# Patient Record
Sex: FEMALE | Race: WHITE | Employment: UNEMPLOYED | URBAN - METROPOLITAN AREA
[De-identification: names, ages, dates, MRNs, and addresses within clinical notes are randomized per-mention and may not be internally consistent; named-entity substitution may affect disease eponyms.]

---

## 2017-02-28 ENCOUNTER — OFFICE VISIT (OUTPATIENT)
Dept: ORTHOPEDIC SURGERY | Age: 57
End: 2017-02-28

## 2017-02-28 VITALS
HEART RATE: 62 BPM | BODY MASS INDEX: 25.21 KG/M2 | SYSTOLIC BLOOD PRESSURE: 116 MMHG | DIASTOLIC BLOOD PRESSURE: 74 MMHG | WEIGHT: 137 LBS | HEIGHT: 62 IN

## 2017-02-28 DIAGNOSIS — M17.10 PATELLOFEMORAL ARTHRITIS: ICD-10-CM

## 2017-02-28 DIAGNOSIS — Z98.890 S/P RIGHT KNEE ARTHROSCOPY: Primary | ICD-10-CM

## 2017-02-28 PROCEDURE — 99213 OFFICE O/P EST LOW 20 MIN: CPT | Performed by: ORTHOPAEDIC SURGERY

## 2017-02-28 PROCEDURE — 20610 DRAIN/INJ JOINT/BURSA W/O US: CPT | Performed by: ORTHOPAEDIC SURGERY

## 2017-04-25 ENCOUNTER — OFFICE VISIT (OUTPATIENT)
Dept: ORTHOPEDIC SURGERY | Age: 57
End: 2017-04-25

## 2017-04-25 VITALS
SYSTOLIC BLOOD PRESSURE: 123 MMHG | DIASTOLIC BLOOD PRESSURE: 72 MMHG | WEIGHT: 137 LBS | BODY MASS INDEX: 25.21 KG/M2 | HEIGHT: 62 IN | HEART RATE: 60 BPM

## 2017-04-25 DIAGNOSIS — M19.90 ARTHROSIS: Primary | ICD-10-CM

## 2017-04-25 DIAGNOSIS — M17.10 PATELLOFEMORAL ARTHRITIS: ICD-10-CM

## 2017-04-25 PROCEDURE — 73560 X-RAY EXAM OF KNEE 1 OR 2: CPT | Performed by: ORTHOPAEDIC SURGERY

## 2017-04-25 PROCEDURE — 99213 OFFICE O/P EST LOW 20 MIN: CPT | Performed by: ORTHOPAEDIC SURGERY

## 2017-05-09 ENCOUNTER — TELEPHONE (OUTPATIENT)
Dept: ORTHOPEDIC SURGERY | Age: 57
End: 2017-05-09

## 2017-06-06 DIAGNOSIS — M17.11 PRIMARY OSTEOARTHRITIS OF RIGHT KNEE: Primary | ICD-10-CM

## 2017-06-08 ENCOUNTER — TELEPHONE (OUTPATIENT)
Dept: ORTHOPEDIC SURGERY | Age: 57
End: 2017-06-08

## 2017-06-13 ENCOUNTER — OFFICE VISIT (OUTPATIENT)
Dept: ORTHOPEDIC SURGERY | Age: 57
End: 2017-06-13

## 2017-06-13 VITALS
HEIGHT: 62 IN | BODY MASS INDEX: 24.48 KG/M2 | DIASTOLIC BLOOD PRESSURE: 77 MMHG | SYSTOLIC BLOOD PRESSURE: 121 MMHG | HEART RATE: 55 BPM | WEIGHT: 133 LBS

## 2017-06-13 DIAGNOSIS — M17.11 PRIMARY OSTEOARTHRITIS OF RIGHT KNEE: Primary | ICD-10-CM

## 2017-06-13 PROCEDURE — 20610 DRAIN/INJ JOINT/BURSA W/O US: CPT | Performed by: ORTHOPAEDIC SURGERY

## 2017-06-20 ENCOUNTER — TELEPHONE (OUTPATIENT)
Dept: ORTHOPEDIC SURGERY | Age: 57
End: 2017-06-20

## 2017-11-07 ENCOUNTER — OFFICE VISIT (OUTPATIENT)
Dept: ORTHOPEDIC SURGERY | Age: 57
End: 2017-11-07

## 2017-11-07 VITALS
BODY MASS INDEX: 24.48 KG/M2 | WEIGHT: 133 LBS | HEART RATE: 61 BPM | HEIGHT: 62 IN | SYSTOLIC BLOOD PRESSURE: 141 MMHG | DIASTOLIC BLOOD PRESSURE: 77 MMHG

## 2017-11-07 DIAGNOSIS — M17.11 PRIMARY OSTEOARTHRITIS OF RIGHT KNEE: Primary | ICD-10-CM

## 2017-11-07 PROCEDURE — 99213 OFFICE O/P EST LOW 20 MIN: CPT | Performed by: ORTHOPAEDIC SURGERY

## 2017-11-07 RX ORDER — ROSUVASTATIN CALCIUM 20 MG/1
TABLET, COATED ORAL
Refills: 3 | COMMUNITY
Start: 2017-10-16 | End: 2018-08-22 | Stop reason: SDUPTHER

## 2017-11-07 NOTE — PROGRESS NOTES
Chief Complaint    Follow-up (right knee; auth for Synvisc injection)      History of Present Illness:  Vaishnavi Rendon is a 55 y.o. female  Pain Assessment  Location of Pain: Knee  Location Modifiers: Right  Severity of Pain: 4  Quality of Pain: Sharp  Duration of Pain:  (weeks)  Aggravating Factors: Standing, Walking  Relieving Factors: Rest, Nsaids  Result of Injury: No  Work-Related Injury: No  Are there other pain locations you wish to document?: No     Patient is being seen for a follow up visit for persistent right knee pain. Patient has been diagnosed with right knee OA and prescribed conservative treatment measures. This has included activity modifications, following HEP developed by physical therapy and injections. Patient was given a Synvisc injection on 6/13/17 and received tremendous relief of symptoms, but has noticed these progressing again. Pain continues to be predominately medial sided, typically achy in nature but can become sharp. Patient has difficulty with prolonged walking and standing. Patient presents for further evaluation and treatment of right knee OA. Medical History:  Patient's medications, allergies, past medical, surgical, social and family histories were reviewed and updated as appropriate.     Allergies   Allergen Reactions    Hydrocodone-Acetaminophen Nausea And Vomiting     Current Outpatient Prescriptions on File Prior to Visit   Medication Sig Dispense Refill    ibuprofen (ADVIL;MOTRIN) 200 MG tablet Take 200 mg by mouth every 6 hours as needed for Pain      carvedilol (COREG) 6.25 MG tablet Take 6.25 mg by mouth 2 times daily (with meals)      escitalopram (LEXAPRO) 10 MG tablet Take 10 mg by mouth daily      aspirin 81 MG tablet Take 81 mg by mouth daily      Multiple Vitamins-Minerals (CENTRUM ADULTS PO) Take by mouth      ezetimibe (ZETIA) 10 MG tablet Take 10 mg by mouth daily      atorvastatin (LIPITOR) 20 MG tablet Take 20 mg by mouth daily       No current in her symptom relief to date. Patient was in agreement with this. All questions answered. Follow up: Upon Synvisc approval      I Janett Colorado MS, ATC, am scribing for and in the presence of Dr. Catalino Leo     11:51 AM    Janett Colorado MS, ATC  This dictation was performed with a verbal recognition program [facility]) and it was checked for errors. It is possible that there are still dictated errors within this office note. If so, please bring any errors to my attention for an addendum. All efforts were made to ensure that this office note is accurate. Supervising Physician Attestation:  I, Dr. Deanna Lu, personally performed the services described in this documentation as scribed above, and it is both accurate and complete and I agree with all pertinent clinical information. I personally interviewed the patient and performed a physical examination and medical decision making. I discussed the patient's condition and treatment options and have  reviewed and agree with the past medical, family and social history unless otherwise noted. All of the patient's questions were answered.       Board Certified Orthopaedic Surgeon  [facility] and [address] and [address] [employer]  Professor of [employer]

## 2017-11-27 ENCOUNTER — TELEPHONE (OUTPATIENT)
Dept: ORTHOPEDIC SURGERY | Age: 57
End: 2017-11-27

## 2017-11-27 NOTE — TELEPHONE ENCOUNTER
11/27/2017  SYNVISC-ONE     RIGHT  KNEE. APPROVED # [id number]. DATES:  12/13/2017 - 06/13/2018. PER FAX FROM [facility].   AP

## 2017-12-10 ENCOUNTER — HOSPITAL ENCOUNTER (EMERGENCY)
Age: 57
Discharge: HOME | End: 2017-12-10
Attending: EMERGENCY MEDICINE
Payer: COMMERCIAL

## 2017-12-10 VITALS
DIASTOLIC BLOOD PRESSURE: 64 MMHG | WEIGHT: 135 LBS | BODY MASS INDEX: 24.84 KG/M2 | OXYGEN SATURATION: 98 % | HEART RATE: 63 BPM | TEMPERATURE: 98.3 F | HEIGHT: 62 IN | SYSTOLIC BLOOD PRESSURE: 116 MMHG | RESPIRATION RATE: 16 BRPM

## 2017-12-10 DIAGNOSIS — J02.9 ACUTE PHARYNGITIS, UNSPECIFIED ETIOLOGY: ICD-10-CM

## 2017-12-10 DIAGNOSIS — J01.00 ACUTE MAXILLARY SINUSITIS, RECURRENCE NOT SPECIFIED: Primary | ICD-10-CM

## 2017-12-10 PROCEDURE — 99213 OFFICE O/P EST LOW 20 MIN: CPT | Performed by: EMERGENCY MEDICINE

## 2017-12-10 PROCEDURE — 99213 OFFICE O/P EST LOW 20 MIN: CPT

## 2017-12-10 RX ORDER — AMOXICILLIN AND CLAVULANATE POTASSIUM 875; 125 MG/1; MG/1
1 TABLET, FILM COATED ORAL 2 TIMES DAILY
Qty: 14 TABLET | Refills: 0 | Status: SHIPPED | OUTPATIENT
Start: 2017-12-10 | End: 2017-12-17

## 2017-12-10 RX ORDER — FLUTICASONE PROPIONATE 50 MCG
1 SPRAY, SUSPENSION (ML) NASAL DAILY
Qty: 1 BOTTLE | Refills: 0 | Status: SHIPPED | OUTPATIENT
Start: 2017-12-10 | End: 2018-08-22 | Stop reason: ALTCHOICE

## 2017-12-10 ASSESSMENT — PAIN DESCRIPTION - LOCATION: LOCATION: FACE

## 2017-12-10 ASSESSMENT — ENCOUNTER SYMPTOMS
TROUBLE SWALLOWING: 0
FACIAL SWELLING: 0
NAUSEA: 0
SORE THROAT: 1
SHORTNESS OF BREATH: 0
ABDOMINAL PAIN: 0
CHOKING: 0
EYE REDNESS: 0
RHINORRHEA: 1
SINUS PRESSURE: 0
EYE DISCHARGE: 0
VOICE CHANGE: 0
CONSTIPATION: 0
BLOOD IN STOOL: 0
WHEEZING: 0
STRIDOR: 0
EYE PAIN: 0
DIARRHEA: 0
VOMITING: 0
COUGH: 1
BACK PAIN: 0

## 2017-12-10 ASSESSMENT — PAIN DESCRIPTION - PAIN TYPE: TYPE: ACUTE PAIN

## 2017-12-10 ASSESSMENT — PAIN SCALES - GENERAL: PAINLEVEL_OUTOF10: 7

## 2017-12-10 ASSESSMENT — PAIN DESCRIPTION - DESCRIPTORS: DESCRIPTORS: PRESSURE

## 2017-12-10 NOTE — LETTER
[address] [facility]  [address] [place], [place] [address]KT MARISOL MONTELONGO II.[facility] [address]  Phone: [phone]               December 10, 2017    Patient: Jaren Medina   YOB: 1960   Date of Visit: 12/10/2017       To Whom It May Concern:    Florentino Parker was seen and treated in our emergency department on 12/10/2017. She may return to work on 12/13/17.   No work December 11December 12, 2017      Sincerely,       Carmina Dietz MD         Signature:__________________________________

## 2017-12-10 NOTE — ED PROVIDER NOTES
Pawel Vu [facility]  Urgent Care Encounter      CHIEF COMPLAINT       Chief Complaint   Patient presents with    Sinusitis    Generalized Body Aches       Nurses Notes reviewed and I agree except as noted in the HPI. HISTORY OF PRESENT ILLNESS   Moni Singh is a 65 y.o. female who presents With 2 day history of maxillary sinus pain, which she rates at 7 out of 10 severity, postnasal drainage, sore throat, fatigue, malaise, painful glands in the neck, dry cough. No chest pain, shortness of breath, abdominal pain, vomiting, dizziness, syncope, altered mental status, lethargy, rash,  symptoms. Nonsmoker. History of hypertension. S/P T&A    REVIEW OF SYSTEMS     Review of Systems   Constitutional: Positive for appetite change and chills. Negative for fatigue, fever and unexpected weight change. HENT: Positive for congestion, postnasal drip, rhinorrhea and sore throat. Negative for ear discharge, ear pain, facial swelling, hearing loss, nosebleeds, sinus pressure, trouble swallowing and voice change. Eyes: Negative for pain, discharge, redness and visual disturbance. Respiratory: Positive for cough. Negative for choking, shortness of breath, wheezing and stridor. Cardiovascular: Negative for chest pain and leg swelling. Gastrointestinal: Negative for abdominal pain, blood in stool, constipation, diarrhea, nausea and vomiting. Genitourinary: Negative for dysuria, flank pain, frequency, hematuria, urgency, vaginal bleeding and vaginal discharge. Musculoskeletal: Negative for arthralgias, back pain, neck pain and neck stiffness. Skin: Negative for rash. Neurological: Positive for headaches. Negative for dizziness, seizures, syncope, weakness and light-headedness. Hematological: Negative for adenopathy. Does not bruise/bleed easily. Psychiatric/Behavioral: Negative for confusion, sleep disturbance and suicidal ideas. The patient is not nervous/anxious.     All other systems Posterior oropharyngeal erythema present. No oropharyngeal exudate, posterior oropharyngeal edema or tonsillar abscesses. Eyes: Conjunctivae and EOM are normal. Pupils are equal, round, and reactive to light. Right eye exhibits no discharge. Left eye exhibits no discharge. No scleral icterus. Neck: Normal range of motion. No JVD present. No thyromegaly present. No meningismus   Cardiovascular: Normal rate, regular rhythm, S1 normal, S2 normal, normal heart sounds, intact distal pulses and normal pulses. Exam reveals no gallop and no friction rub. No murmur heard. Pulmonary/Chest: Effort normal and breath sounds normal. No stridor. No tachypnea. No respiratory distress. She has no decreased breath sounds. She has no wheezes. She has no rhonchi. She has no rales. She exhibits no tenderness. Dry cough, lungs clear   Abdominal: Soft. Bowel sounds are normal. She exhibits no distension and no mass. There is no tenderness. There is no rebound and no guarding. Musculoskeletal: Normal range of motion. She exhibits no edema or tenderness. Right lower leg: Normal.        Left lower leg: Normal.   Lymphadenopathy:     She has cervical adenopathy. Right cervical: Superficial cervical and deep cervical adenopathy present. Left cervical: Superficial cervical and deep cervical adenopathy present. Neurological: She is alert and oriented to person, place, and time. She has normal reflexes. No cranial nerve deficit. She exhibits normal muscle tone. Coordination normal.   Appropriate, no focal findings   Skin: Skin is warm and dry. No rash noted. She is not diaphoretic. No erythema. No rash   Psychiatric: She has a normal mood and affect. Her behavior is normal. Judgment and thought content normal.   Nursing note and vitals reviewed. DIAGNOSTIC RESULTS   Labs:No results found for this visit on 12/10/17.     IMAGING:  No orders to display     URGENT CARE COURSE:     Vitals:    12/10/17 1208 BP: 116/64   Pulse: 63   Resp: 16   Temp: 98.3 °F (36.8 °C)   TempSrc: Oral   SpO2: 98%   Weight: 135 lb (61.2 kg)   Height: 5' 2\" (1.575 m)       Medications - No data to display  PROCEDURES:  None  FINAL IMPRESSION      1. Acute maxillary sinusitis, recurrence not specified    2. Acute pharyngitis, unspecified etiology        DISPOSITION/PLAN   DISPOSITION Decision to Discharge  nontoxic, well-hydrated, normal airway. No airway abscess or epiglottitis, sepsis, CNS infection, pneumonia, hypoxia, bronchospasm. Patient has acute sinusitis and acute pharyngitis.   Will treat with Augmentin, Flonase, Coricidin HBP, Tylenol, Motrin, increased oral clear liquids, vaporizer, rest.  Patient to recheck with PCP in 5 days if problems persist, and understands to go to ED if worse  PATIENT REFERRED TO:  Alyce Downing, [address] [facility] [address]  [phone]    Schedule an appointment as soon as possible for a visit in 5 days  recheck if problems persist, go to emergency if worse    DISCHARGE MEDICATIONS:  New Prescriptions    AMOXICILLIN-CLAVULANATE (AUGMENTIN) 875-125 MG PER TABLET    Take 1 tablet by mouth 2 times daily for 7 days    DEXTROMETHORPHAN-GUAIFENESIN (CORICIDIN HBP CONGESTION/COUGH)  MG CAPS    Take 1 tablet by mouth 4 times daily as needed (1 by mouth 4 times daily for sinus pain and pressure, congestion, postnasal drainage, ear pain and pressure, cough)    FLUTICASONE (FLONASE) 50 MCG/ACT NASAL SPRAY    1 spray by Nasal route daily     Current Discharge Medication List          MD Malini Osman MD  12/10/17 0956

## 2017-12-14 ENCOUNTER — OFFICE VISIT (OUTPATIENT)
Dept: ORTHOPEDIC SURGERY | Age: 57
End: 2017-12-14

## 2017-12-14 VITALS
WEIGHT: 134.92 LBS | HEART RATE: 64 BPM | BODY MASS INDEX: 24.83 KG/M2 | DIASTOLIC BLOOD PRESSURE: 91 MMHG | SYSTOLIC BLOOD PRESSURE: 148 MMHG | HEIGHT: 62 IN

## 2017-12-14 DIAGNOSIS — M17.11 PRIMARY OSTEOARTHRITIS OF RIGHT KNEE: Primary | ICD-10-CM

## 2017-12-14 PROCEDURE — 99213 OFFICE O/P EST LOW 20 MIN: CPT | Performed by: ORTHOPAEDIC SURGERY

## 2017-12-14 PROCEDURE — 20610 DRAIN/INJ JOINT/BURSA W/O US: CPT | Performed by: ORTHOPAEDIC SURGERY

## 2017-12-14 NOTE — PROGRESS NOTES
[address] [facility]  Follow Up Knee Pain      Chief Complaint    Follow-up (right knee)      History of Present Illness:  Clay Curtis is a 65y.o. year old female who presents for synviscOne injection into her right knee. Her last injection provided relief until about 6 weeks ago. Pain Assessment  Location of Pain: Knee  Location Modifiers: Right  Severity of Pain: 4  Quality of Pain: Sharp  Frequency of Pain: Constant (just when standing and moving of the knee )  Aggravating Factors: Standing (twisting and moving of the knee)  Limiting Behavior: Yes  Relieving Factors: Rest  Result of Injury: No  Work-Related Injury: No  Are there other pain locations you wish to document?: No    Past Medical History:   Diagnosis Date    Hyperlipidemia     Hypertension     Meniscus tear       Social History     Social History    Marital status:      Spouse name: N/A    Number of children: N/A    Years of education: N/A     Occupational History    Not on file.      Social History Main Topics    Smoking status: Never Smoker    Smokeless tobacco: Never Used    Alcohol use 0.0 oz/week      Comment: occ    Drug use: No    Sexual activity: Not on file     Other Topics Concern    Not on file     Social History Narrative    ** Merged History Encounter **          Current Outpatient Prescriptions on File Prior to Visit   Medication Sig Dispense Refill    amoxicillin-clavulanate (AUGMENTIN) 875-125 MG per tablet Take 1 tablet by mouth 2 times daily for 7 days 14 tablet 0    rosuvastatin (CRESTOR) 20 MG tablet TK 1 T PO QD  3    ibuprofen (ADVIL;MOTRIN) 200 MG tablet Take 200 mg by mouth every 6 hours as needed for Pain      carvedilol (COREG) 6.25 MG tablet Take 6.25 mg by mouth 2 times daily (with meals)      escitalopram (LEXAPRO) 10 MG tablet Take 10 mg by mouth daily      aspirin 81 MG tablet Take 81 mg by mouth daily      Multiple Vitamins-Minerals (CENTRUM ADULTS

## 2018-02-16 ENCOUNTER — TELEPHONE (OUTPATIENT)
Dept: ORTHOPEDIC SURGERY | Age: 58
End: 2018-02-16

## 2018-03-01 ENCOUNTER — OFFICE VISIT (OUTPATIENT)
Dept: ORTHOPEDIC SURGERY | Age: 58
End: 2018-03-01

## 2018-03-01 VITALS
SYSTOLIC BLOOD PRESSURE: 126 MMHG | WEIGHT: 134 LBS | BODY MASS INDEX: 24.66 KG/M2 | HEART RATE: 62 BPM | HEIGHT: 62 IN | DIASTOLIC BLOOD PRESSURE: 84 MMHG

## 2018-03-01 DIAGNOSIS — M25.561 RIGHT KNEE PAIN, UNSPECIFIED CHRONICITY: ICD-10-CM

## 2018-03-01 DIAGNOSIS — M17.10 PATELLOFEMORAL ARTHRITIS: Primary | ICD-10-CM

## 2018-03-01 DIAGNOSIS — M23.203 DEGENERATIVE TEAR OF MEDIAL MENISCUS, RIGHT: ICD-10-CM

## 2018-03-01 PROCEDURE — 99213 OFFICE O/P EST LOW 20 MIN: CPT | Performed by: ORTHOPAEDIC SURGERY

## 2018-03-08 DIAGNOSIS — S83.241A TEAR OF MEDIAL MENISCUS OF RIGHT KNEE, UNSPECIFIED TEAR TYPE, UNSPECIFIED WHETHER OLD OR CURRENT TEAR, INITIAL ENCOUNTER: Primary | ICD-10-CM

## 2018-03-22 ENCOUNTER — OFFICE VISIT (OUTPATIENT)
Dept: ORTHOPEDIC SURGERY | Age: 58
End: 2018-03-22

## 2018-03-22 VITALS
HEART RATE: 65 BPM | HEIGHT: 62 IN | WEIGHT: 134 LBS | DIASTOLIC BLOOD PRESSURE: 90 MMHG | BODY MASS INDEX: 24.66 KG/M2 | SYSTOLIC BLOOD PRESSURE: 140 MMHG

## 2018-03-22 DIAGNOSIS — M25.561 RIGHT KNEE PAIN, UNSPECIFIED CHRONICITY: Primary | ICD-10-CM

## 2018-03-22 DIAGNOSIS — M17.11 ARTHRITIS OF RIGHT KNEE: ICD-10-CM

## 2018-03-22 PROCEDURE — 99213 OFFICE O/P EST LOW 20 MIN: CPT | Performed by: ORTHOPAEDIC SURGERY

## 2018-03-22 NOTE — PROGRESS NOTES
ROS  No change in medical status
MRI that was performed. She does have medial compartment arthrosis that is quite advanced. Today she reports that her symptoms have subsided significantly. We are recommending that she stay conservative and her management which includes weight loss reduction, physical therapy, use of oral anti-inflammatories and intermittent use of injections as needed. She currently is not responding well to the injections including cortisone and Visco supplementation. Should she continue to have persistent symptoms we can consider doing a partial medial unicompartmental replacement. Follow up in: oeb    1:36 PM      Kay Pereira, [address] [facility] Physician Assistant    During this examination, Caren Mcmillan PA-C, functioned as a scribe for Dr. Rufino Garcia. This dictation was performed with a verbal recognition program ([employer]) and it was checked for errors. It is possible that there are still dictated errors within this office note. If so, please bring any errors to my attention for an addendum. All efforts were made to ensure that this office note is accurate. This dictation was performed with a verbal recognition program (DRAGON) and it was checked for errors. It is possible that there are still dictated errors within this office note. If so, please bring any errors to my attention for an addendum. All efforts were made to ensure that this office note is accurate. Supervising Physician Attestation:  I, Dr. Rufino Garcia, personally performed the services described in this documentation as scribed above, and it is both accurate and complete and I agree with all pertinent clinical information. I personally interviewed the patient and performed a physical examination and medical decision making. I discussed the patient's condition and treatment options and have  reviewed and agree with the past medical, family and social history unless otherwise noted.  All of the patient's questions were

## 2018-04-12 ENCOUNTER — OFFICE VISIT (OUTPATIENT)
Dept: ORTHOPEDIC SURGERY | Age: 58
End: 2018-04-12

## 2018-04-12 VITALS
WEIGHT: 134 LBS | SYSTOLIC BLOOD PRESSURE: 150 MMHG | DIASTOLIC BLOOD PRESSURE: 91 MMHG | BODY MASS INDEX: 24.66 KG/M2 | HEART RATE: 61 BPM | HEIGHT: 62 IN

## 2018-04-12 DIAGNOSIS — E65 FAT PAD SYNDROME: ICD-10-CM

## 2018-04-12 DIAGNOSIS — M17.11 PRIMARY OSTEOARTHRITIS OF RIGHT KNEE: ICD-10-CM

## 2018-04-12 DIAGNOSIS — M25.561 RIGHT KNEE PAIN, UNSPECIFIED CHRONICITY: Primary | ICD-10-CM

## 2018-04-12 DIAGNOSIS — M17.10 PATELLOFEMORAL ARTHRITIS: ICD-10-CM

## 2018-04-12 PROCEDURE — 20610 DRAIN/INJ JOINT/BURSA W/O US: CPT | Performed by: ORTHOPAEDIC SURGERY

## 2018-04-12 PROCEDURE — 99213 OFFICE O/P EST LOW 20 MIN: CPT | Performed by: ORTHOPAEDIC SURGERY

## 2018-05-21 LAB
CHOLESTEROL, TOTAL: 205 MG/DL
CHOLESTEROL/HDL RATIO: ABNORMAL
HDLC SERPL-MCNC: 77 MG/DL (ref 35–70)
LDL CHOLESTEROL CALCULATED: 118 MG/DL (ref 0–160)
TRIGL SERPL-MCNC: 50 MG/DL
VLDLC SERPL CALC-MCNC: ABNORMAL MG/DL

## 2018-06-06 ENCOUNTER — TELEPHONE (OUTPATIENT)
Dept: ORTHOPEDIC SURGERY | Age: 58
End: 2018-06-06

## 2018-06-06 DIAGNOSIS — M17.11 PRIMARY LOCALIZED OSTEOARTHROSIS OF THE KNEE, RIGHT: Primary | ICD-10-CM

## 2018-06-25 ENCOUNTER — TELEPHONE (OUTPATIENT)
Dept: ORTHOPEDIC SURGERY | Age: 58
End: 2018-06-25

## 2018-06-26 ENCOUNTER — OFFICE VISIT (OUTPATIENT)
Dept: ORTHOPEDIC SURGERY | Age: 58
End: 2018-06-26

## 2018-06-26 VITALS
WEIGHT: 134 LBS | HEART RATE: 65 BPM | BODY MASS INDEX: 24.66 KG/M2 | HEIGHT: 62 IN | SYSTOLIC BLOOD PRESSURE: 146 MMHG | DIASTOLIC BLOOD PRESSURE: 84 MMHG

## 2018-06-26 DIAGNOSIS — M17.11 PRIMARY OSTEOARTHRITIS OF RIGHT KNEE: ICD-10-CM

## 2018-06-26 DIAGNOSIS — M17.10 PATELLOFEMORAL ARTHRITIS: ICD-10-CM

## 2018-06-26 DIAGNOSIS — M25.561 CHRONIC PAIN OF RIGHT KNEE: Primary | ICD-10-CM

## 2018-06-26 DIAGNOSIS — G89.29 CHRONIC PAIN OF RIGHT KNEE: Primary | ICD-10-CM

## 2018-06-26 PROCEDURE — 20610 DRAIN/INJ JOINT/BURSA W/O US: CPT | Performed by: ORTHOPAEDIC SURGERY

## 2018-07-24 ENCOUNTER — OFFICE VISIT (OUTPATIENT)
Dept: ORTHOPEDIC SURGERY | Age: 58
End: 2018-07-24

## 2018-07-24 VITALS
SYSTOLIC BLOOD PRESSURE: 138 MMHG | WEIGHT: 134 LBS | HEART RATE: 78 BPM | BODY MASS INDEX: 24.66 KG/M2 | HEIGHT: 62 IN | DIASTOLIC BLOOD PRESSURE: 87 MMHG

## 2018-07-24 DIAGNOSIS — M17.11 PRIMARY OSTEOARTHRITIS OF RIGHT KNEE: Primary | ICD-10-CM

## 2018-07-24 DIAGNOSIS — M25.561 CHRONIC PAIN OF RIGHT KNEE: ICD-10-CM

## 2018-07-24 DIAGNOSIS — G89.29 CHRONIC PAIN OF RIGHT KNEE: ICD-10-CM

## 2018-07-24 PROCEDURE — 99214 OFFICE O/P EST MOD 30 MIN: CPT | Performed by: ORTHOPAEDIC SURGERY

## 2018-07-24 NOTE — PROGRESS NOTES
oz/week      Comment: occ    Drug use: No    Sexual activity: Not Asked     Other Topics Concern    None     Social History Narrative    ** Merged History Encounter **            Current Outpatient Prescriptions   Medication Sig Dispense Refill    fluticasone (FLONASE) 50 MCG/ACT nasal spray 1 spray by Nasal route daily 1 Bottle 0    rosuvastatin (CRESTOR) 20 MG tablet TK 1 T PO QD  3    ibuprofen (ADVIL;MOTRIN) 200 MG tablet Take 200 mg by mouth every 6 hours as needed for Pain      aspirin 81 MG tablet Take 81 mg by mouth daily      Multiple Vitamins-Minerals (CENTRUM ADULTS PO) Take by mouth      Dextromethorphan-guaiFENesin (CORICIDIN HBP CONGESTION/COUGH)  MG CAPS Take 1 tablet by mouth 4 times daily as needed (1 by mouth 4 times daily for sinus pain and pressure, congestion, postnasal drainage, ear pain and pressure, cough) 30 capsule 0    carvedilol (COREG) 6.25 MG tablet Take 6.25 mg by mouth 2 times daily (with meals)      escitalopram (LEXAPRO) 10 MG tablet Take 10 mg by mouth daily       No current facility-administered medications for this visit. Allergies   Allergen Reactions    Hydrocodone-Acetaminophen Nausea And Vomiting       Review of Systems:  A 14 point review of systems was completed by the patient and is available in the media section of the scanned medical record and was reviewed on 7/24/2018. The review is negative with the exception of those things mentioned in the HPI and Past Medical History     Vital Signs:   /87   Pulse 78   Ht 5' 2\" (1.575 m)   Wt 134 lb (60.8 kg)   LMP 11/19/2013   BMI 24.51 kg/m²     General Exam:   Mental Status: The patient is oriented to time, place and person. The patient's mood and affect are appropriate. Neurological: The patient has good coordination. There is no weakness or sensory deficit. Knee Examination: RIGHT    Skin:  There are no skin lesions, cellulitis, or extreme edema in the lower extremities.  Sensation is

## 2018-07-30 ENCOUNTER — HOSPITAL ENCOUNTER (OUTPATIENT)
Dept: WOMENS IMAGING | Age: 58
Discharge: HOME | End: 2018-07-30
Payer: COMMERCIAL

## 2018-07-30 ENCOUNTER — HOSPITAL ENCOUNTER (EMERGENCY)
Age: 58
Discharge: HOME | End: 2018-07-30
Payer: COMMERCIAL

## 2018-07-30 VITALS
BODY MASS INDEX: 25.42 KG/M2 | DIASTOLIC BLOOD PRESSURE: 84 MMHG | HEART RATE: 78 BPM | SYSTOLIC BLOOD PRESSURE: 138 MMHG | OXYGEN SATURATION: 99 % | RESPIRATION RATE: 16 BRPM | WEIGHT: 139 LBS | TEMPERATURE: 98 F

## 2018-07-30 DIAGNOSIS — Z12.31 VISIT FOR SCREENING MAMMOGRAM: ICD-10-CM

## 2018-07-30 DIAGNOSIS — J06.9 UPPER RESPIRATORY TRACT INFECTION, UNSPECIFIED TYPE: Primary | ICD-10-CM

## 2018-07-30 LAB
GROUP A STREP CULTURE, REFLEX: NEGATIVE
REFLEX THROAT C + S: NORMAL

## 2018-07-30 PROCEDURE — 77063 BREAST TOMOSYNTHESIS BI: CPT

## 2018-07-30 PROCEDURE — 99213 OFFICE O/P EST LOW 20 MIN: CPT

## 2018-07-30 PROCEDURE — 99213 OFFICE O/P EST LOW 20 MIN: CPT | Performed by: NURSE PRACTITIONER

## 2018-07-30 PROCEDURE — 87070 CULTURE OTHR SPECIMN AEROBIC: CPT

## 2018-07-30 RX ORDER — AMOXICILLIN AND CLAVULANATE POTASSIUM 875; 125 MG/1; MG/1
1 TABLET, FILM COATED ORAL 2 TIMES DAILY
Qty: 20 TABLET | Refills: 0 | Status: SHIPPED | OUTPATIENT
Start: 2018-07-30 | End: 2018-08-09

## 2018-07-30 ASSESSMENT — ENCOUNTER SYMPTOMS
SINUS PRESSURE: 0
VOICE CHANGE: 1
RHINORRHEA: 0
DIARRHEA: 0
VOMITING: 0
SHORTNESS OF BREATH: 0
SORE THROAT: 0
CHEST TIGHTNESS: 0
ABDOMINAL PAIN: 0
NAUSEA: 0

## 2018-07-30 ASSESSMENT — PAIN SCALES - GENERAL: PAINLEVEL_OUTOF10: 4

## 2018-07-30 ASSESSMENT — PAIN DESCRIPTION - PAIN TYPE: TYPE: ACUTE PAIN

## 2018-07-30 ASSESSMENT — PAIN DESCRIPTION - DESCRIPTORS: DESCRIPTORS: ACHING

## 2018-07-30 ASSESSMENT — PAIN DESCRIPTION - FREQUENCY: FREQUENCY: CONTINUOUS

## 2018-07-30 ASSESSMENT — PAIN DESCRIPTION - LOCATION: LOCATION: NECK

## 2018-07-30 NOTE — ED PROVIDER NOTES
Neurological: She is alert and oriented to person, place, and time. Skin: Skin is warm and dry. No rash (on exposed surfaces) noted. She is not diaphoretic. Psychiatric: She has a normal mood and affect. Her speech is normal.   Nursing note and vitals reviewed. DIAGNOSTIC RESULTS   Labs:  Results for orders placed or performed during the hospital encounter of 07/30/18   STREP A ANTIGEN   Result Value Ref Range    GROUP A STREP CULTURE, REFLEX NEGATIVE        IMAGING:  No orders to display     URGENT CARE COURSE:     Vitals:    07/30/18 1150 07/30/18 1154   BP:  138/84   Pulse:  78   Resp:  16   Temp:  98 °F (36.7 °C)   TempSrc:  Oral   SpO2:  99%   Weight: 139 lb (63 kg)        Medications - No data to display  PROCEDURES:  None  FINAL IMPRESSION      1. Upper respiratory tract infection, unspecified type        DISPOSITION/PLAN   DISPOSITION    Discharge   Physical assessment findings, diagnostic testing(s) if applicable, and vital signs reviewed with patient/patient representative. Questions answered. Medications as directed, including OTC medications for supportive care. Education provided on medications. Differential diagnosis(s) discussed with patient/patient representative. Home care/self care instructions reviewed with patient/patient representative. Patient is to follow-up with family care provider in 2-3 days if no improvement. Patient is to go to the emergency department if symptoms worsen. Patient/patient representative is aware of care plan, questions answered, verbalizes understanding and is in agreement. Teach back method used for patient/patient representative teaching(s) and printed instructions attached to after visit summary.     PATIENT REFERRED TO:  Brayan Addison MD  [facility] 23  [address] [facility]  [facility]  [phone]    Schedule an appointment as soon as possible for a visit in 1 week  for further evalation, If symptoms worsen, GO DIRECTLY TO THE EMERGENCY

## 2018-07-31 ENCOUNTER — HOSPITAL ENCOUNTER (OUTPATIENT)
Dept: WOMENS IMAGING | Age: 58
Discharge: HOME | End: 2018-07-31
Payer: COMMERCIAL

## 2018-07-31 DIAGNOSIS — Z12.31 VISIT FOR SCREENING MAMMOGRAM: ICD-10-CM

## 2018-07-31 PROCEDURE — 3209999900 MAM COMPARISON OF OUTSIDE IMAGES

## 2018-08-01 LAB — THROAT/NOSE CULTURE: NORMAL

## 2018-08-10 ENCOUNTER — TELEPHONE (OUTPATIENT)
Dept: ORTHOPEDIC SURGERY | Age: 58
End: 2018-08-10

## 2018-08-10 DIAGNOSIS — M17.11 PRIMARY OSTEOARTHRITIS OF RIGHT KNEE: Primary | ICD-10-CM

## 2018-08-13 ENCOUNTER — HOSPITAL ENCOUNTER (OUTPATIENT)
Dept: CT IMAGING | Age: 58
Discharge: HOME | End: 2018-08-13
Payer: COMMERCIAL

## 2018-08-13 DIAGNOSIS — M17.11 PRIMARY OSTEOARTHRITIS OF RIGHT KNEE: ICD-10-CM

## 2018-08-13 PROCEDURE — 73700 CT LOWER EXTREMITY W/O DYE: CPT

## 2018-08-15 NOTE — PROGRESS NOTES
[facility] ([facility]) [address] [facility], [address]    Acknowledgment of Informed Consent for Surgical or Medical Procedure and Sedation  I agree to allow doctor(s) Kaykay Pleitez and his/her associates or assistants, including residents and/or other qualified medical practitioner to perform the following medical treatment or procedure and to administer or direct the administration of sedation as necessary:  Procedure(s): RIGHT KNEE MEDIAL ARTHROPLASTY (Linda Lewis)  My doctor has explained the following regarding the proposed procedure:   the explanation of the procedure   the benefits of the procedure   the potential problems that might occur during recuperation   the risks and side effects of the procedure which could include but are not limited to severe blood loss, infection, stroke or death   the benefits, risks and side effect of alternative procedures including the consequences of declining this procedure or any alternative procedures   the likelihood of achieving satisfactory results. I acknowledge no guarantee or assurance has been made to me regarding the results. I understand that during the course of this treatment/procedure, unforeseen conditions can occur which require an additional or different procedure. I agree to allow my physician or assistants to perform such extension of the original procedure as they may find necessary. I understand that sedation will often result in temporary impairment of memory and fine motor skills and that sedation can occasionally progress to a state of deep sedation or general anesthesia. I understand the risks of anesthesia for surgery include, but are not limited to, sore throat, hoarseness, injury to face, mouth, or teeth; nausea; headache; injury to blood vessels or nerves; death, brain damage, or paralysis.     I understand that if I have a Limitation of Treatment order in effect during my hospitalization, the order may or may not be in effect during this procedure. I give my doctor permission to give me blood or blood products. I understand that there are risks with receiving blood such as hepatitis, AIDS, fever, or allergic reaction. I acknowledge that the risks, benefits, and alternatives of this treatment have been explained to me and that no express or implied warranty has been given by the hospital, any blood bank, or any person or entity as to the blood or blood components transfused. At the discretion of my doctor, I agree to allow observers, equipment/product representatives and allow photographing, and/or televising of the procedure, provided my name or identity is maintained confidentially. I agree the hospital may dispose of or use for scientific or educational purposes any tissue, fluid, or body parts which may be removed.     ________________________________Date________Time______ am/pm  (Elem [facility])  Patient or Signature of Closest Relative or Legal Guardian    ________________________________Date________Time______am/pm      Page 1 of  1  Witness

## 2018-08-22 ENCOUNTER — OFFICE VISIT (OUTPATIENT)
Dept: FAMILY MEDICINE CLINIC | Age: 58
End: 2018-08-22
Payer: COMMERCIAL

## 2018-08-22 VITALS
BODY MASS INDEX: 25.21 KG/M2 | TEMPERATURE: 97.5 F | RESPIRATION RATE: 16 BRPM | DIASTOLIC BLOOD PRESSURE: 76 MMHG | WEIGHT: 137 LBS | HEIGHT: 62 IN | SYSTOLIC BLOOD PRESSURE: 130 MMHG | HEART RATE: 72 BPM

## 2018-08-22 DIAGNOSIS — M25.561 ACUTE PAIN OF RIGHT KNEE: Primary | ICD-10-CM

## 2018-08-22 DIAGNOSIS — E78.5 HYPERLIPIDEMIA, UNSPECIFIED HYPERLIPIDEMIA TYPE: ICD-10-CM

## 2018-08-22 DIAGNOSIS — Z72.89 OTHER PROBLEMS RELATED TO LIFESTYLE: ICD-10-CM

## 2018-08-22 PROCEDURE — 99243 OFF/OP CNSLTJ NEW/EST LOW 30: CPT | Performed by: FAMILY MEDICINE

## 2018-08-22 RX ORDER — ROSUVASTATIN CALCIUM 20 MG/1
20 TABLET, COATED ORAL DAILY
Qty: 30 TABLET | Refills: 11 | Status: SHIPPED | OUTPATIENT
Start: 2018-08-22 | End: 2018-12-10 | Stop reason: DRUGHIGH

## 2018-08-22 ASSESSMENT — ENCOUNTER SYMPTOMS
SHORTNESS OF BREATH: 0
CHEST TIGHTNESS: 0
NAUSEA: 0
ABDOMINAL PAIN: 0
VOMITING: 0
ANAL BLEEDING: 0
BLOOD IN STOOL: 0
DIARRHEA: 0
CONSTIPATION: 0

## 2018-08-22 ASSESSMENT — PATIENT HEALTH QUESTIONNAIRE - PHQ9
1. LITTLE INTEREST OR PLEASURE IN DOING THINGS: 0
SUM OF ALL RESPONSES TO PHQ QUESTIONS 1-9: 0
SUM OF ALL RESPONSES TO PHQ QUESTIONS 1-9: 0
SUM OF ALL RESPONSES TO PHQ9 QUESTIONS 1 & 2: 0
2. FEELING DOWN, DEPRESSED OR HOPELESS: 0
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
## 2018-08-23 ENCOUNTER — HOSPITAL ENCOUNTER (OUTPATIENT)
Dept: PREADMISSION TESTING | Age: 58
Discharge: HOME | End: 2018-08-27
Payer: COMMERCIAL

## 2018-08-23 ENCOUNTER — OFFICE VISIT (OUTPATIENT)
Dept: ORTHOPEDIC SURGERY | Age: 58
End: 2018-08-23

## 2018-08-23 ENCOUNTER — HOSPITAL ENCOUNTER (OUTPATIENT)
Dept: PHYSICAL THERAPY | Age: 58
Setting detail: THERAPIES SERIES
Discharge: HOME | End: 2018-08-23
Payer: COMMERCIAL

## 2018-08-23 VITALS
BODY MASS INDEX: 25.21 KG/M2 | WEIGHT: 137 LBS | DIASTOLIC BLOOD PRESSURE: 80 MMHG | HEART RATE: 77 BPM | HEIGHT: 62 IN | SYSTOLIC BLOOD PRESSURE: 136 MMHG

## 2018-08-23 VITALS
RESPIRATION RATE: 14 BRPM | BODY MASS INDEX: 25.4 KG/M2 | DIASTOLIC BLOOD PRESSURE: 82 MMHG | TEMPERATURE: 97.4 F | HEIGHT: 62 IN | HEART RATE: 77 BPM | WEIGHT: 138 LBS | SYSTOLIC BLOOD PRESSURE: 130 MMHG | OXYGEN SATURATION: 99 %

## 2018-08-23 DIAGNOSIS — R52 PAIN: Primary | ICD-10-CM

## 2018-08-23 DIAGNOSIS — M17.11 PRIMARY OSTEOARTHRITIS OF RIGHT KNEE: ICD-10-CM

## 2018-08-23 LAB
ALBUMIN SERPL-MCNC: 5 G/DL (ref 3.4–5)
ALP BLD-CCNC: 80 U/L (ref 40–129)
ALT SERPL-CCNC: 15 U/L (ref 10–40)
ANION GAP SERPL CALCULATED.3IONS-SCNC: 11 MMOL/L (ref 3–16)
APTT: 35 SEC (ref 26–36)
AST SERPL-CCNC: 24 U/L (ref 15–37)
BILIRUB SERPL-MCNC: 0.4 MG/DL (ref 0–1)
BILIRUBIN DIRECT: <0.2 MG/DL (ref 0–0.3)
BILIRUBIN, INDIRECT: NORMAL MG/DL (ref 0–1)
BUN BLDV-MCNC: 11 MG/DL (ref 7–20)
CALCIUM SERPL-MCNC: 10.1 MG/DL (ref 8.3–10.6)
CHLORIDE BLD-SCNC: 99 MMOL/L (ref 99–110)
CO2: 29 MMOL/L (ref 21–32)
CREAT SERPL-MCNC: 0.5 MG/DL (ref 0.6–1.1)
EKG ATRIAL RATE: 63 BPM
EKG DIAGNOSIS: NORMAL
EKG P AXIS: 45 DEGREES
EKG P-R INTERVAL: 144 MS
EKG Q-T INTERVAL: 402 MS
EKG QRS DURATION: 82 MS
EKG QTC CALCULATION (BAZETT): 411 MS
EKG R AXIS: 22 DEGREES
EKG T AXIS: 54 DEGREES
EKG VENTRICULAR RATE: 63 BPM
GFR AFRICAN AMERICAN: >60
GFR NON-AFRICAN AMERICAN: >60
GLUCOSE BLD-MCNC: 96 MG/DL (ref 70–99)
HCT VFR BLD CALC: 38.2 % (ref 36–48)
HEMOGLOBIN: 13 G/DL (ref 12–16)
INR BLD: 1.09 (ref 0.86–1.14)
MCH RBC QN AUTO: 31.6 PG (ref 26–34)
MCHC RBC AUTO-ENTMCNC: 33.9 G/DL (ref 31–36)
MCV RBC AUTO: 93.3 FL (ref 80–100)
PDW BLD-RTO: 13.3 % (ref 12.4–15.4)
PLATELET # BLD: 223 K/UL (ref 135–450)
PMV BLD AUTO: 7.9 FL (ref 5–10.5)
POTASSIUM SERPL-SCNC: 4.1 MMOL/L (ref 3.5–5.1)
PROTHROMBIN TIME: 12.4 SEC (ref 9.8–13)
RBC # BLD: 4.1 M/UL (ref 4–5.2)
SODIUM BLD-SCNC: 139 MMOL/L (ref 136–145)
TOTAL PROTEIN: 8 G/DL (ref 6.4–8.2)
WBC # BLD: 4.6 K/UL (ref 4–11)

## 2018-08-23 PROCEDURE — 97162 PT EVAL MOD COMPLEX 30 MIN: CPT | Performed by: PHYSICAL THERAPIST

## 2018-08-23 PROCEDURE — G8979 MOBILITY GOAL STATUS: HCPCS | Performed by: PHYSICAL THERAPIST

## 2018-08-23 PROCEDURE — 85730 THROMBOPLASTIN TIME PARTIAL: CPT

## 2018-08-23 PROCEDURE — 85027 COMPLETE CBC AUTOMATED: CPT

## 2018-08-23 PROCEDURE — 85610 PROTHROMBIN TIME: CPT

## 2018-08-23 PROCEDURE — 93005 ELECTROCARDIOGRAM TRACING: CPT

## 2018-08-23 PROCEDURE — G8978 MOBILITY CURRENT STATUS: HCPCS | Performed by: PHYSICAL THERAPIST

## 2018-08-23 PROCEDURE — PREOPEXAM PRE-OP EXAM: Performed by: ORTHOPAEDIC SURGERY

## 2018-08-23 PROCEDURE — 87641 MR-STAPH DNA AMP PROBE: CPT

## 2018-08-23 PROCEDURE — 80048 BASIC METABOLIC PNL TOTAL CA: CPT

## 2018-08-23 PROCEDURE — 80076 HEPATIC FUNCTION PANEL: CPT

## 2018-08-23 PROCEDURE — MISC250 COMPRESSION STOCKING: Performed by: ORTHOPAEDIC SURGERY

## 2018-08-23 PROCEDURE — 97530 THERAPEUTIC ACTIVITIES: CPT | Performed by: PHYSICAL THERAPIST

## 2018-08-23 RX ORDER — OXYCODONE HYDROCHLORIDE AND ACETAMINOPHEN 5; 325 MG/1; MG/1
1 TABLET ORAL EVERY 6 HOURS PRN
Qty: 28 TABLET | Refills: 0 | Status: SHIPPED | OUTPATIENT
Start: 2018-08-23 | End: 2018-08-30

## 2018-08-23 ASSESSMENT — PAIN SCALES - GENERAL: PAINLEVEL_OUTOF10: 3

## 2018-08-23 ASSESSMENT — PAIN DESCRIPTION - FREQUENCY: FREQUENCY: CONTINUOUS

## 2018-08-23 ASSESSMENT — PAIN DESCRIPTION - PAIN TYPE: TYPE: CHRONIC PAIN

## 2018-08-23 ASSESSMENT — PAIN DESCRIPTION - DESCRIPTORS: DESCRIPTORS: ACHING

## 2018-08-23 ASSESSMENT — PAIN DESCRIPTION - LOCATION: LOCATION: KNEE

## 2018-08-23 NOTE — FLOWSHEET NOTE
8/23/18  Special Test Results/Comment   Homans NPV   Temp NPV      8/23/18  Girth L R   Mid Patella 39.2 40.4   Suprapatellar 39.8 41.6   5cm above 43.7 44.7   15cm above          TEST INITIAL   GOAL   SINGLE LEG STANCE TIME L: 30\"+     R: 30\"+   >25 SECONDS         Reflexes/Sensation:               [x]Dermatomes/Myotomes intact               [x]Reflexes equal and normal bilaterally              []Other:     Joint mobility:                [x]Normal               []Hypo              []Hyper     Palpation: anteromedial joint line     Functional Mobility/Transfers: mod Ind     Posture: good     Bandages/Dressings/Incisions: previous scars from scopes     Gait: (include devices/WB status) difficulty maintaining straight line; varus thrust on R, mildly toed out on R          RESTRICTIONS/PRECAUTIONS: none    Exercises/Interventions:   Exercise/Equipment Resistance/Repetitions Other comments   Stretching     Hamstring     Towel Pull     Inclined Calf     Hip Flexion     ITB     Groin     Quad                    SLR     Supine     Abduction     Adduction     Prone     SLR+          Isometrics     Quad sets          Patellar Glides     Medial     Superior     Inferior          ROM     Sheet Pulls     Hang Weights     Passive     Active     Weight Shift     Ankle Pumps                    CKC     Calf raises     Wall sits     Step ups     1 leg stand     Squatting     CC TKE     Balance     bridges          PRE     Extension  RANGE:   Flexion  RANGE:        Quantum machines     Leg press      Leg extension     Leg curl          Manual interventions                     Therapeutic Exercise and NMR EXR  [x] (12064) Provided verbal/tactile cueing for activities related to strengthening, flexibility, endurance, ROM for improvements in LE, proximal hip, and core control with self care, mobility, lifting, ambulation.  [] (61269) Provided verbal/tactile cueing for activities related to improving balance, coordination, face-to-face)  [] RE-EVAL   [] CK(06576) x      [] IONTO  [] NMR (61207) x      [] VASO  [] Manual (25691) x       [] Other:  [x] TA x  2    [] Mech Traction (32319)  [] ES(attended) (24401)      [] ES (un) (94736):     GOALS:   Patient stated goal: Be able to walk long distances, carry loads and do stairs without pain.      Therapist goals for Patient:   Short Term Goals: To be achieved in: 2 weeks  1. Independent in HEP and progression per patient tolerance, in order to prevent re-injury. 2. Patient will have a decrease in pain to facilitate improvement in movement, function, and ADLs as indicated by Functional Deficits.     Long Term Goals: To be achieved in: 12 weeks  1. Disability index score of 30% or less for the LEFS to assist with reaching prior level of function. 2. Patient will demonstrate increased AROM to 0-135 deg to allow for proper joint functioning as indicated by patients Functional Deficits. 3. Patient will demonstrate an increase in Strength to good proximal hip strength and control in LE to allow for proper functional mobility as indicated by patients Functional Deficits. 4. Patient will return to household functional activities without increased symptoms or restriction. 5. Pt will be able to walk for 15 min without restriction in order to complete normal work duties. (patient specific functional goal)             Progression Towards Functional goals:  [] Patient is progressing as expected towards functional goals listed. [] Progression is slowed due to complexities listed. [] Progression has been slowed due to co-morbidities.   [x] Plan just implemented, too soon to assess goals progression  [] Other:     ASSESSMENT:  See eval    Treatment/Activity Tolerance:  [x] Patient tolerated treatment well [] Patient limited by fatique  [] Patient limited by pain  [] Patient limited by other medical complications  [] Other:     Patient education:  8/23/18: full pre-op instructions    Prognosis: [x] Good [] Fair  [] Poor    Patient Requires Follow-up: [x] Yes  [] No    PLAN: See eval  [] Continue per plan of care [] Alter current plan (see comments)  [x] Plan of care initiated [] Hold pending MD visit [] Discharge    Electronically signed by: Mehran Jackson PT, DPT

## 2018-08-23 NOTE — PROGRESS NOTES
Snoring? Do you snore loudly (loud enough to be heard through closed doors, or your bed partner elbows you for snoring at night)? No    Tired? Do you often feel tired, fatigued, or sleepy during the daytime (such as falling asleep during driving)? No    Observed? Has anyone observed you stop breathing or choking/gasping during your sleep? No    Pressure? Do you have or are being treated for high blood pressure? No    Neck Size? (measured around Mauris apple)  For male, is your shirt collar 17 inches or larger? For female, is your shirt collar 16 inches or larger? No    Age older than 48years old? Yes    Gender = Male  No    Body Mass Index more than 35 kg/m2?   No    Risk of CHARLINE Scoring criteria:    [x] Low risk:  Yes to 0 - 2 questions    [] Intermediate risk:  Yes to 3 - 4 questions    [] High risk:  Yes to 5 - 8 questions

## 2018-08-23 NOTE — PROGRESS NOTES
[address] [employer] [address]                          Date of Procedure 8/27 Time of Procedure 0930    PRIOR TO PROCEDURE DATE:  1. Please follow any guidelines/instructions prior to your procedure as advised by your surgeon. 2. Arrange for someone to drive you home and be with you for the first 24 hours after discharge for your safety after your procedure for which you received sedation. Ensure it is someone we can share information with regarding your discharge. 3. You must contact your surgeon for instructions IF:   You are taking any blood thinners, aspirin, anti-inflammatory or vitamin E.   There is a change in your physical condition such as a cold, fever, rash, cuts, sores or any other infection, especially near your surgical site. 4. Do not drink alcohol the day before or day of your procedure. 5. A Pre-op History and Physical for surgery MUST be completed by your Physician or Urgent Care within 30 days of your procedure date. Please bring a copy with you on the day of your procedure and along with any other testing performed. THE DAY OF YOUR PROCEDURE:  1. Follow instructions for ARRIVAL TIME as DIRECTED BY YOUR SURGEON. If your surgeon does not give you a specific arrival time, please arrive at  [facility] . 2. Enter the MAIN entrance from [address] [place] [address] and follow the signs to the free Location or [employer] parking (offered free of charge 6am-5pm). 3. Enter the Main Entrance of the hospital (do NOT enter from the lower level of the parking garage). Upon entrance, check in with the  at the main desk on your left. If no one is available at the desk, proceed into the [facility] Waiting Room and go through the door directly into the [facility]. There is a Check-in desk ACROSS from Room 5 (marked with a sign hanging from the ceiling).  The phone number for the surgery center is [phone].    4. DO NOT EAT OR DRINK ANYTHING

## 2018-08-23 NOTE — PLAN OF CARE
The [address] [place] [address] [place] [address]                                                       Physical Therapy Certification    Dear Referring Practitioner: Luis Lopez,    We had the pleasure of evaluating the following patient for physical therapy services at [address] [place] [address]. A summary of our findings can be found in the initial assessment below. This includes our plan of care. If you have any questions or concerns regarding these findings, please do not hesitate to contact me at the office phone number checked above. Thank you for the referral.       Physician Signature:_______________________________Date:__________________  By signing above (or electronic signature), therapists plan is approved by physician      Patient: Bridgette Cornejo   : 1960   MRN: [id number]  Referring Physician: Referring Practitioner: Luis Lopez      Evaluation Date: 2018      Medical Diagnosis Information:  Diagnosis: M17.11 (ICD-10-CM) - Primary osteoarthritis of right knee   Treatment Diagnosis: Pt presents with s/s consistent with MD diagnosis. Currently has mildly decreased ROM, weakness of entire LE, altered gait and decreased functional mobility. Insurance information: PT Insurance Information: PT BENEFITS 2018/ [employer]/ ACTIVE/ DED 6400 MET 2600.11/ OOP 12,800/ PAYS 100%/ 40 VPCY COMB/ O AUTH REQ/ REF# [id number]/ 18 CB/      Precautions/ Contra-indications:  None    Latex Allergy:  [x]NO      []YES  Preferred Language for Healthcare:   [x]English       []other:    SUBJECTIVE: Patient stated complaint: Pt has had about 8 year hx of R knee that began only when lifting and carrying things. Tried to treat conservatively with PT and injections. Continued to have pain on a more regular basis. Pt had two scopes, one in  and one in .  No benefit []Other:          Barriers to/and or personal factors that will affect rehab potential:              []Age  []Sex              []Motivation/Lack of Motivation                        []Co-Morbidities              []Cognitive Function, education/learning barriers              []Environmental, home barriers              []profession/work barriers  []past PT/medical experience  []other:  Justification:     Falls Risk Assessment (30 days):   [x] Falls Risk assessed and no intervention required. [] Falls Risk assessed and Patient requires intervention due to being higher risk   TUG score (>12s at risk):     [] Falls education provided, including       G-Codes:  PT G-Codes  Functional Assessment Tool Used: LEFS  Score: 65% deficit  Functional Limitation: Mobility: Walking and moving around  Mobility: Walking and Moving Around Current Status (): At least 60 percent but less than 80 percent impaired, limited or restricted  Mobility: Walking and Moving Around Goal Status ():  At least 20 percent but less than 40 percent impaired, limited or restricted    ASSESSMENT:   Functional Impairments:     []Noted lumbar/proximal hip/LE hypomobility   []Decreased LE functional ROM   [x]Decreased core/proximal hip strength and neuromuscular control   [x]Decreased LE functional strength   [x]Reduced balance/proprioceptive control   []other:      Functional Activity Limitations (from functional questionnaire and intake)   [x]Reduced ability to tolerate prolonged functional positions   []Reduced ability or difficulty with changes of positions or transfers between positions   [x]Reduced ability to maintain good posture and demonstrate good body mechanics with sitting, bending, and lifting   [x]Reduced ability to sleep   [] Reduced ability or tolerance with driving and/or computer work   [x]Reduced ability to perform lifting, carrying tasks   [x]Reduced ability to squat   []Reduced ability to forward bend   [x]Reduced ability to

## 2018-08-23 NOTE — PROGRESS NOTES
with the patient today. Patient education material was provided. Questions were entertained and answered to the patient's satisfaction. The patient is scheduled for LUIS ALFREDO PF Replacement and a discussion and comprehensive presentation of the RBA including providing extensive information and written material on the surgery, PO course and rehab and patient expectations and compliance. PO instructions were provided and details on the DVT program and skin preparation pre op explained. The risks explained included but not limited to infection, pain, swelling, woumd healing, blood clots, bleeding, fibrosis, anesthesia, allergies meds, atrophy, and limitation in knee motion, implant loosening, need for additional surgery, alignment problems. Success rates reviewed including a normal knee cannot be established and there are limitations on activity that a TKR requires including 10% of patients in general having knee pain limits, weakness on chair on stair activity, and in particular that recreational activity may require major limits. The final result cannot be predicted and each patient responds to surgery differently. The patient verbally expressed an understanding and all questions answered. The patient has major arthritic damage to the knee joint with pain limiting daily activities and significant restrictions and limitations effecting the quality of life. All conservative measures have been completed and the patient wishes to undergo the partial replacement. Eren Morgan being given increased dosage/quantity of opioid pain medication in excess of OSMB guidelines which noted a 30 MED of opioids due to the fact that she has undergone major orthopaedic surgery as outlined in rule 4731-11-13. Dosages and further duration of the pain medication will be re-evaluated at her post op visit.  @ was educated on dosing expectations and limits of prescribing as a result of the new [employer] enacted August 31, 2017. Please also note that this is not the initial opioid prescription issued to her but a continuation of medication utilized during the hospital admission as noted in the medical record. OARRS report has also been utilized to screen for any abuse history or suspicious activity as outlined in [place]. All efforts have been taken to prevent abuse potential and misuse of opioid medications. Follow up post-operatively in PT    [unfilled]  1:34 PM      IMable ATC am scribing for and in the presence of Dr. Marbin Coughlin. 08/23/18 1:34 PM   Mable Aly ATC    This dictation was performed with a verbal recognition program (DRAGON) and it was checked for errors. It is possible that there are still dictated errors within this office note. If so, please bring any errors to my attention for an addendum. All efforts were made to ensure that this office note is accurate. This dictation was performed with a verbal recognition program (DRAGON) and it was checked for errors. It is possible that there are still dictated errors within this office note. If so, please bring any errors to my attention for an addendum. All efforts were made to ensure that this office note is accurate. Supervising Physician Attestation:  I, Dr. Marbin Coughlin, personally performed the services described in this documentation as scribed above, and it is both accurate and complete and I agree with all pertinent clinical information. I personally interviewed the patient and performed a physical examination and medical decision making. I discussed the patient's condition and treatment options and have  reviewed and agree with the past medical, family and social history unless otherwise noted. All of the patient's questions were answered.       Board Certified Orthopaedic Surgeon  [facility] and [address] [facility] and [address] [facility] [address]

## 2018-08-24 LAB — MRSA SCREEN RT-PCR: NORMAL

## 2018-08-27 ENCOUNTER — ANESTHESIA (OUTPATIENT)
Dept: OPERATING ROOM | Age: 58
End: 2018-08-27
Payer: COMMERCIAL

## 2018-08-27 ENCOUNTER — HOSPITAL ENCOUNTER (OUTPATIENT)
Age: 58
Setting detail: OBSERVATION
Discharge: HOME | End: 2018-08-28
Attending: ORTHOPAEDIC SURGERY | Admitting: ORTHOPAEDIC SURGERY
Payer: COMMERCIAL

## 2018-08-27 ENCOUNTER — ANESTHESIA EVENT (OUTPATIENT)
Dept: OPERATING ROOM | Age: 58
End: 2018-08-27
Payer: COMMERCIAL

## 2018-08-27 VITALS — OXYGEN SATURATION: 100 % | TEMPERATURE: 67.3 F | SYSTOLIC BLOOD PRESSURE: 114 MMHG | DIASTOLIC BLOOD PRESSURE: 69 MMHG

## 2018-08-27 PROBLEM — M17.10 OSTEOARTHRITIS, LOCALIZED, KNEE: Status: ACTIVE | Noted: 2018-08-27

## 2018-08-27 LAB
GLUCOSE BLD-MCNC: 131 MG/DL (ref 70–99)
PERFORMED ON: ABNORMAL

## 2018-08-27 PROCEDURE — 2500000003 HC RX 250 WO HCPCS: Performed by: NURSE ANESTHETIST, CERTIFIED REGISTERED

## 2018-08-27 PROCEDURE — 2780000010 HC IMPLANT OTHER: Performed by: ORTHOPAEDIC SURGERY

## 2018-08-27 PROCEDURE — 2580000003 HC RX 258: Performed by: ANESTHESIOLOGY

## 2018-08-27 PROCEDURE — 3600000014 HC SURGERY LEVEL 4 ADDTL 15MIN: Performed by: ORTHOPAEDIC SURGERY

## 2018-08-27 PROCEDURE — 2580000003 HC RX 258: Performed by: ORTHOPAEDIC SURGERY

## 2018-08-27 PROCEDURE — 2709999900 HC NON-CHARGEABLE SUPPLY: Performed by: ORTHOPAEDIC SURGERY

## 2018-08-27 PROCEDURE — 6360000002 HC RX W HCPCS: Performed by: ORTHOPAEDIC SURGERY

## 2018-08-27 PROCEDURE — 6360000002 HC RX W HCPCS: Performed by: ANESTHESIOLOGY

## 2018-08-27 PROCEDURE — 2500000003 HC RX 250 WO HCPCS: Performed by: ORTHOPAEDIC SURGERY

## 2018-08-27 PROCEDURE — 6370000000 HC RX 637 (ALT 250 FOR IP): Performed by: ANESTHESIOLOGY

## 2018-08-27 PROCEDURE — G0378 HOSPITAL OBSERVATION PER HR: HCPCS

## 2018-08-27 PROCEDURE — 6370000000 HC RX 637 (ALT 250 FOR IP): Performed by: ORTHOPAEDIC SURGERY

## 2018-08-27 PROCEDURE — 3600000004 HC SURGERY LEVEL 4 BASE: Performed by: ORTHOPAEDIC SURGERY

## 2018-08-27 PROCEDURE — 3700000001 HC ADD 15 MINUTES (ANESTHESIA): Performed by: ORTHOPAEDIC SURGERY

## 2018-08-27 PROCEDURE — C1713 ANCHOR/SCREW BN/BN,TIS/BN: HCPCS | Performed by: ORTHOPAEDIC SURGERY

## 2018-08-27 PROCEDURE — 94761 N-INVAS EAR/PLS OXIMETRY MLT: CPT

## 2018-08-27 PROCEDURE — 94150 VITAL CAPACITY TEST: CPT

## 2018-08-27 PROCEDURE — 3700000000 HC ANESTHESIA ATTENDED CARE: Performed by: ORTHOPAEDIC SURGERY

## 2018-08-27 PROCEDURE — 7100000001 HC PACU RECOVERY - ADDTL 15 MIN: Performed by: ORTHOPAEDIC SURGERY

## 2018-08-27 PROCEDURE — 6360000002 HC RX W HCPCS

## 2018-08-27 PROCEDURE — 6360000002 HC RX W HCPCS: Performed by: NURSE ANESTHETIST, CERTIFIED REGISTERED

## 2018-08-27 PROCEDURE — C1760 CLOSURE DEV, VASC: HCPCS | Performed by: ORTHOPAEDIC SURGERY

## 2018-08-27 PROCEDURE — 2720000010 HC SURG SUPPLY STERILE: Performed by: ORTHOPAEDIC SURGERY

## 2018-08-27 PROCEDURE — 7100000000 HC PACU RECOVERY - FIRST 15 MIN: Performed by: ORTHOPAEDIC SURGERY

## 2018-08-27 PROCEDURE — C1776 JOINT DEVICE (IMPLANTABLE): HCPCS | Performed by: ORTHOPAEDIC SURGERY

## 2018-08-27 PROCEDURE — 2580000003 HC RX 258: Performed by: NURSE ANESTHETIST, CERTIFIED REGISTERED

## 2018-08-27 DEVICE — CEMENT BNE 20ML 41GM FULL DOSE PMMA W/ TOBRA M VISC RADPQ: Type: IMPLANTABLE DEVICE | Site: KNEE | Status: FUNCTIONAL

## 2018-08-27 DEVICE — COMPONENT TOT KNEE CAPPED ADV STRYKNEEA] STRYKER CORP]: Type: IMPLANTABLE DEVICE | Site: KNEE | Status: FUNCTIONAL

## 2018-08-27 DEVICE — COMPONENT FEM SZ 13 L MEDIAL/RIGHT LAT UNI KNEE FOR: Type: IMPLANTABLE DEVICE | Site: KNEE | Status: FUNCTIONAL

## 2018-08-27 DEVICE — INSERT TIB SZ 2 THK8MM UNI KNEE UHMWPE ONLAY MAKO X3: Type: IMPLANTABLE DEVICE | Site: KNEE | Status: FUNCTIONAL

## 2018-08-27 DEVICE — BASEPLATE TIB SZ 2 R MED L LAT KNEE TI UNI RESTORIS MCK: Type: IMPLANTABLE DEVICE | Site: KNEE | Status: FUNCTIONAL

## 2018-08-27 RX ORDER — ROCURONIUM BROMIDE 10 MG/ML
INJECTION, SOLUTION INTRAVENOUS PRN
Status: DISCONTINUED | OUTPATIENT
Start: 2018-08-27 | End: 2018-08-27 | Stop reason: SDUPTHER

## 2018-08-27 RX ORDER — NEOSTIGMINE METHYLSULFATE 5 MG/5 ML
SYRINGE (ML) INTRAVENOUS PRN
Status: DISCONTINUED | OUTPATIENT
Start: 2018-08-27 | End: 2018-08-27 | Stop reason: SDUPTHER

## 2018-08-27 RX ORDER — OXYCODONE HYDROCHLORIDE 5 MG/1
10 TABLET ORAL EVERY 4 HOURS PRN
Status: DISCONTINUED | OUTPATIENT
Start: 2018-08-27 | End: 2018-08-28 | Stop reason: HOSPADM

## 2018-08-27 RX ORDER — PROMETHAZINE HYDROCHLORIDE 25 MG/ML
6.25 INJECTION, SOLUTION INTRAMUSCULAR; INTRAVENOUS
Status: DISCONTINUED | OUTPATIENT
Start: 2018-08-27 | End: 2018-08-28 | Stop reason: HOSPADM

## 2018-08-27 RX ORDER — DEXTROSE MONOHYDRATE 50 MG/ML
100 INJECTION, SOLUTION INTRAVENOUS PRN
Status: DISCONTINUED | OUTPATIENT
Start: 2018-08-27 | End: 2018-08-28 | Stop reason: HOSPADM

## 2018-08-27 RX ORDER — ACETAMINOPHEN 325 MG/1
650 TABLET ORAL EVERY 4 HOURS PRN
Status: DISCONTINUED | OUTPATIENT
Start: 2018-08-27 | End: 2018-08-28 | Stop reason: HOSPADM

## 2018-08-27 RX ORDER — OXYCODONE HYDROCHLORIDE 5 MG/1
5 TABLET ORAL EVERY 4 HOURS PRN
Status: DISCONTINUED | OUTPATIENT
Start: 2018-08-27 | End: 2018-08-28 | Stop reason: HOSPADM

## 2018-08-27 RX ORDER — HYDRALAZINE HYDROCHLORIDE 20 MG/ML
5 INJECTION INTRAMUSCULAR; INTRAVENOUS EVERY 10 MIN PRN
Status: DISCONTINUED | OUTPATIENT
Start: 2018-08-27 | End: 2018-08-27

## 2018-08-27 RX ORDER — LIDOCAINE HYDROCHLORIDE 20 MG/ML
INJECTION, SOLUTION INFILTRATION; PERINEURAL PRN
Status: DISCONTINUED | OUTPATIENT
Start: 2018-08-27 | End: 2018-08-27 | Stop reason: SDUPTHER

## 2018-08-27 RX ORDER — HYDROMORPHONE HCL 110MG/55ML
PATIENT CONTROLLED ANALGESIA SYRINGE INTRAVENOUS PRN
Status: DISCONTINUED | OUTPATIENT
Start: 2018-08-27 | End: 2018-08-27 | Stop reason: SDUPTHER

## 2018-08-27 RX ORDER — SODIUM CHLORIDE, SODIUM LACTATE, POTASSIUM CHLORIDE, CALCIUM CHLORIDE 600; 310; 30; 20 MG/100ML; MG/100ML; MG/100ML; MG/100ML
INJECTION, SOLUTION INTRAVENOUS CONTINUOUS
Status: DISCONTINUED | OUTPATIENT
Start: 2018-08-27 | End: 2018-08-27

## 2018-08-27 RX ORDER — PROMETHAZINE HYDROCHLORIDE 25 MG/ML
INJECTION, SOLUTION INTRAMUSCULAR; INTRAVENOUS
Status: COMPLETED
Start: 2018-08-27 | End: 2018-08-27

## 2018-08-27 RX ORDER — ATORVASTATIN CALCIUM 80 MG/1
80 TABLET, FILM COATED ORAL NIGHTLY
Status: DISCONTINUED | OUTPATIENT
Start: 2018-08-27 | End: 2018-08-28 | Stop reason: HOSPADM

## 2018-08-27 RX ORDER — LABETALOL HYDROCHLORIDE 5 MG/ML
5 INJECTION, SOLUTION INTRAVENOUS EVERY 10 MIN PRN
Status: DISCONTINUED | OUTPATIENT
Start: 2018-08-27 | End: 2018-08-27

## 2018-08-27 RX ORDER — EPHEDRINE SULFATE 50 MG/ML
INJECTION INTRAVENOUS PRN
Status: DISCONTINUED | OUTPATIENT
Start: 2018-08-27 | End: 2018-08-27 | Stop reason: SDUPTHER

## 2018-08-27 RX ORDER — LIDOCAINE HYDROCHLORIDE 10 MG/ML
1 INJECTION, SOLUTION EPIDURAL; INFILTRATION; INTRACAUDAL; PERINEURAL
Status: DISCONTINUED | OUTPATIENT
Start: 2018-08-27 | End: 2018-08-27

## 2018-08-27 RX ORDER — DOCUSATE SODIUM 100 MG/1
100 CAPSULE, LIQUID FILLED ORAL 2 TIMES DAILY
Status: DISCONTINUED | OUTPATIENT
Start: 2018-08-27 | End: 2018-08-28 | Stop reason: HOSPADM

## 2018-08-27 RX ORDER — ONDANSETRON 2 MG/ML
INJECTION INTRAMUSCULAR; INTRAVENOUS PRN
Status: DISCONTINUED | OUTPATIENT
Start: 2018-08-27 | End: 2018-08-27 | Stop reason: SDUPTHER

## 2018-08-27 RX ORDER — PROPOFOL 10 MG/ML
INJECTION, EMULSION INTRAVENOUS PRN
Status: DISCONTINUED | OUTPATIENT
Start: 2018-08-27 | End: 2018-08-27 | Stop reason: SDUPTHER

## 2018-08-27 RX ORDER — SODIUM CHLORIDE 0.9 % (FLUSH) 0.9 %
10 SYRINGE (ML) INJECTION PRN
Status: DISCONTINUED | OUTPATIENT
Start: 2018-08-27 | End: 2018-08-28 | Stop reason: HOSPADM

## 2018-08-27 RX ORDER — SODIUM CHLORIDE 9 MG/ML
INJECTION, SOLUTION INTRAVENOUS CONTINUOUS
Status: DISCONTINUED | OUTPATIENT
Start: 2018-08-27 | End: 2018-08-28 | Stop reason: HOSPADM

## 2018-08-27 RX ORDER — ONDANSETRON 2 MG/ML
4 INJECTION INTRAMUSCULAR; INTRAVENOUS EVERY 6 HOURS PRN
Status: DISCONTINUED | OUTPATIENT
Start: 2018-08-27 | End: 2018-08-28 | Stop reason: HOSPADM

## 2018-08-27 RX ORDER — DEXTROSE MONOHYDRATE 25 G/50ML
12.5 INJECTION, SOLUTION INTRAVENOUS PRN
Status: DISCONTINUED | OUTPATIENT
Start: 2018-08-27 | End: 2018-08-28 | Stop reason: HOSPADM

## 2018-08-27 RX ORDER — SODIUM CHLORIDE, SODIUM LACTATE, POTASSIUM CHLORIDE, CALCIUM CHLORIDE 600; 310; 30; 20 MG/100ML; MG/100ML; MG/100ML; MG/100ML
INJECTION, SOLUTION INTRAVENOUS CONTINUOUS PRN
Status: DISCONTINUED | OUTPATIENT
Start: 2018-08-27 | End: 2018-08-27 | Stop reason: SDUPTHER

## 2018-08-27 RX ORDER — SODIUM CHLORIDE, SODIUM LACTATE, POTASSIUM CHLORIDE, CALCIUM CHLORIDE 600; 310; 30; 20 MG/100ML; MG/100ML; MG/100ML; MG/100ML
INJECTION, SOLUTION INTRAVENOUS CONTINUOUS
Status: DISCONTINUED | OUTPATIENT
Start: 2018-08-27 | End: 2018-08-28 | Stop reason: HOSPADM

## 2018-08-27 RX ORDER — SODIUM CHLORIDE 0.9 % (FLUSH) 0.9 %
10 SYRINGE (ML) INJECTION EVERY 12 HOURS SCHEDULED
Status: DISCONTINUED | OUTPATIENT
Start: 2018-08-27 | End: 2018-08-28 | Stop reason: HOSPADM

## 2018-08-27 RX ORDER — NICOTINE POLACRILEX 4 MG
15 LOZENGE BUCCAL PRN
Status: DISCONTINUED | OUTPATIENT
Start: 2018-08-27 | End: 2018-08-28 | Stop reason: HOSPADM

## 2018-08-27 RX ORDER — ONDANSETRON 2 MG/ML
4 INJECTION INTRAMUSCULAR; INTRAVENOUS
Status: COMPLETED | OUTPATIENT
Start: 2018-08-27 | End: 2018-08-27

## 2018-08-27 RX ORDER — SCOLOPAMINE TRANSDERMAL SYSTEM 1 MG/1
1 PATCH, EXTENDED RELEASE TRANSDERMAL ONCE
Status: DISCONTINUED | OUTPATIENT
Start: 2018-08-27 | End: 2018-08-28 | Stop reason: HOSPADM

## 2018-08-27 RX ORDER — ROSUVASTATIN CALCIUM 10 MG/1
20 TABLET, COATED ORAL DAILY
Status: DISCONTINUED | OUTPATIENT
Start: 2018-08-27 | End: 2018-08-27 | Stop reason: CLARIF

## 2018-08-27 RX ORDER — MAGNESIUM HYDROXIDE 1200 MG/15ML
LIQUID ORAL CONTINUOUS PRN
Status: COMPLETED | OUTPATIENT
Start: 2018-08-27 | End: 2018-08-27

## 2018-08-27 RX ORDER — DEXAMETHASONE SODIUM PHOSPHATE 4 MG/ML
10 INJECTION, SOLUTION INTRA-ARTICULAR; INTRALESIONAL; INTRAMUSCULAR; INTRAVENOUS; SOFT TISSUE EVERY 12 HOURS
Status: COMPLETED | OUTPATIENT
Start: 2018-08-27 | End: 2018-08-28

## 2018-08-27 RX ORDER — SODIUM CHLORIDE 0.9 % (FLUSH) 0.9 %
10 SYRINGE (ML) INJECTION EVERY 12 HOURS SCHEDULED
Status: DISCONTINUED | OUTPATIENT
Start: 2018-08-27 | End: 2018-08-27

## 2018-08-27 RX ORDER — SODIUM CHLORIDE 0.9 % (FLUSH) 0.9 %
10 SYRINGE (ML) INJECTION PRN
Status: DISCONTINUED | OUTPATIENT
Start: 2018-08-27 | End: 2018-08-27

## 2018-08-27 RX ORDER — DEXAMETHASONE SODIUM PHOSPHATE 10 MG/ML
INJECTION, SOLUTION INTRAMUSCULAR; INTRAVENOUS PRN
Status: DISCONTINUED | OUTPATIENT
Start: 2018-08-27 | End: 2018-08-27 | Stop reason: SDUPTHER

## 2018-08-27 RX ORDER — DEXTROSE MONOHYDRATE 25 G/50ML
12.5 INJECTION, SOLUTION INTRAVENOUS PRN
Status: DISCONTINUED | OUTPATIENT
Start: 2018-08-27 | End: 2018-08-27 | Stop reason: SDUPTHER

## 2018-08-27 RX ORDER — DEXAMETHASONE SODIUM PHOSPHATE 4 MG/ML
6 INJECTION, SOLUTION INTRA-ARTICULAR; INTRALESIONAL; INTRAMUSCULAR; INTRAVENOUS; SOFT TISSUE EVERY 12 HOURS
Status: DISCONTINUED | OUTPATIENT
Start: 2018-08-28 | End: 2018-08-28 | Stop reason: HOSPADM

## 2018-08-27 RX ORDER — GLYCOPYRROLATE 0.2 MG/ML
INJECTION INTRAMUSCULAR; INTRAVENOUS PRN
Status: DISCONTINUED | OUTPATIENT
Start: 2018-08-27 | End: 2018-08-27 | Stop reason: SDUPTHER

## 2018-08-27 RX ADMIN — PROPOFOL 200 MG: 10 INJECTION, EMULSION INTRAVENOUS at 09:57

## 2018-08-27 RX ADMIN — ONDANSETRON HYDROCHLORIDE 4 MG: 2 INJECTION, SOLUTION INTRAMUSCULAR; INTRAVENOUS at 22:37

## 2018-08-27 RX ADMIN — DOCUSATE SODIUM 100 MG: 100 CAPSULE, LIQUID FILLED ORAL at 21:57

## 2018-08-27 RX ADMIN — Medication 2 G: at 09:50

## 2018-08-27 RX ADMIN — Medication 2 G: at 20:35

## 2018-08-27 RX ADMIN — CEFAZOLIN SODIUM 2 G: 2 SOLUTION INTRAVENOUS at 09:56

## 2018-08-27 RX ADMIN — DEXAMETHASONE SODIUM PHOSPHATE 10 MG: 10 INJECTION, SOLUTION INTRAMUSCULAR; INTRAVENOUS at 10:01

## 2018-08-27 RX ADMIN — HYDROMORPHONE HYDROCHLORIDE 0.5 MG: 1 INJECTION, SOLUTION INTRAMUSCULAR; INTRAVENOUS; SUBCUTANEOUS at 15:17

## 2018-08-27 RX ADMIN — ATORVASTATIN CALCIUM 80 MG: 80 TABLET, FILM COATED ORAL at 21:57

## 2018-08-27 RX ADMIN — SODIUM CHLORIDE, SODIUM LACTATE, POTASSIUM CHLORIDE, AND CALCIUM CHLORIDE: 600; 310; 30; 20 INJECTION, SOLUTION INTRAVENOUS at 09:37

## 2018-08-27 RX ADMIN — ROCURONIUM BROMIDE 50 MG: 10 INJECTION, SOLUTION INTRAVENOUS at 09:58

## 2018-08-27 RX ADMIN — LIDOCAINE HYDROCHLORIDE 100 MG: 20 INJECTION, SOLUTION INFILTRATION; PERINEURAL at 09:57

## 2018-08-27 RX ADMIN — SODIUM CHLORIDE, POTASSIUM CHLORIDE, SODIUM LACTATE AND CALCIUM CHLORIDE: 600; 310; 30; 20 INJECTION, SOLUTION INTRAVENOUS at 08:40

## 2018-08-27 RX ADMIN — TRANEXAMIC ACID 1000 MG: 1 INJECTION, SOLUTION INTRAVENOUS at 21:56

## 2018-08-27 RX ADMIN — PHENYLEPHRINE HYDROCHLORIDE 100 MCG: 10 INJECTION, SOLUTION INTRAMUSCULAR; INTRAVENOUS; SUBCUTANEOUS at 12:35

## 2018-08-27 RX ADMIN — ONDANSETRON 4 MG: 2 INJECTION INTRAMUSCULAR; INTRAVENOUS at 12:27

## 2018-08-27 RX ADMIN — PROMETHAZINE HYDROCHLORIDE 6.25 MG: 25 INJECTION, SOLUTION INTRAMUSCULAR; INTRAVENOUS at 14:45

## 2018-08-27 RX ADMIN — HYDROMORPHONE HYDROCHLORIDE 0.5 MG: 1 INJECTION, SOLUTION INTRAMUSCULAR; INTRAVENOUS; SUBCUTANEOUS at 15:01

## 2018-08-27 RX ADMIN — SODIUM CHLORIDE, SODIUM LACTATE, POTASSIUM CHLORIDE, AND CALCIUM CHLORIDE: 600; 310; 30; 20 INJECTION, SOLUTION INTRAVENOUS at 10:55

## 2018-08-27 RX ADMIN — DEXAMETHASONE SODIUM PHOSPHATE 10 MG: 4 INJECTION, SOLUTION INTRA-ARTICULAR; INTRALESIONAL; INTRAMUSCULAR; INTRAVENOUS; SOFT TISSUE at 21:57

## 2018-08-27 RX ADMIN — HYDROMORPHONE HYDROCHLORIDE 2 MG: 2 INJECTION, SOLUTION INTRAMUSCULAR; INTRAVENOUS; SUBCUTANEOUS at 10:49

## 2018-08-27 RX ADMIN — TRANEXAMIC ACID 1000 MG: 1 INJECTION, SOLUTION INTRAVENOUS at 12:30

## 2018-08-27 RX ADMIN — Medication 4 MG: at 12:46

## 2018-08-27 RX ADMIN — OXYCODONE HYDROCHLORIDE 10 MG: 5 TABLET ORAL at 19:25

## 2018-08-27 RX ADMIN — HYDROMORPHONE HYDROCHLORIDE 0.5 MG: 1 INJECTION, SOLUTION INTRAMUSCULAR; INTRAVENOUS; SUBCUTANEOUS at 20:31

## 2018-08-27 RX ADMIN — PROMETHAZINE HYDROCHLORIDE 6.25 MG: 25 INJECTION INTRAMUSCULAR; INTRAVENOUS at 14:45

## 2018-08-27 RX ADMIN — EPHEDRINE SULFATE 10 MG: 50 INJECTION INTRAVENOUS at 11:42

## 2018-08-27 RX ADMIN — Medication 10 ML: at 22:02

## 2018-08-27 RX ADMIN — GLYCOPYRROLATE 0.6 MG: 0.2 INJECTION INTRAMUSCULAR; INTRAVENOUS at 12:46

## 2018-08-27 RX ADMIN — HYDROMORPHONE HYDROCHLORIDE 0.25 MG: 1 INJECTION, SOLUTION INTRAMUSCULAR; INTRAVENOUS; SUBCUTANEOUS at 16:14

## 2018-08-27 RX ADMIN — ASPIRIN 325 MG: 325 TABLET, DELAYED RELEASE ORAL at 21:57

## 2018-08-27 RX ADMIN — ONDANSETRON 4 MG: 2 INJECTION INTRAMUSCULAR; INTRAVENOUS at 14:11

## 2018-08-27 ASSESSMENT — PULMONARY FUNCTION TESTS
PIF_VALUE: 19
PIF_VALUE: 1
PIF_VALUE: 14
PIF_VALUE: 19
PIF_VALUE: 19
PIF_VALUE: 17
PIF_VALUE: 19
PIF_VALUE: 18
PIF_VALUE: 1
PIF_VALUE: 19
PIF_VALUE: 19
PIF_VALUE: 21
PIF_VALUE: 19
PIF_VALUE: 18
PIF_VALUE: 19
PIF_VALUE: 20
PIF_VALUE: 18
PIF_VALUE: 19
PIF_VALUE: 21
PIF_VALUE: 5
PIF_VALUE: 15
PIF_VALUE: 20
PIF_VALUE: 18
PIF_VALUE: 19
PIF_VALUE: 19
PIF_VALUE: 18
PIF_VALUE: 16
PIF_VALUE: 19
PIF_VALUE: 19
PIF_VALUE: 17
PIF_VALUE: 21
PIF_VALUE: 6
PIF_VALUE: 24
PIF_VALUE: 26
PIF_VALUE: 19
PIF_VALUE: 17
PIF_VALUE: 15
PIF_VALUE: 19
PIF_VALUE: 18
PIF_VALUE: 18
PIF_VALUE: 20
PIF_VALUE: 19
PIF_VALUE: 18
PIF_VALUE: 19
PIF_VALUE: 18
PIF_VALUE: 19
PIF_VALUE: 18
PIF_VALUE: 1
PIF_VALUE: 17
PIF_VALUE: 18
PIF_VALUE: 17
PIF_VALUE: 19
PIF_VALUE: 20
PIF_VALUE: 19
PIF_VALUE: 20
PIF_VALUE: 1
PIF_VALUE: 9
PIF_VALUE: 19
PIF_VALUE: 19
PIF_VALUE: 17
PIF_VALUE: 20
PIF_VALUE: 19
PIF_VALUE: 19
PIF_VALUE: 17
PIF_VALUE: 19
PIF_VALUE: 19
PIF_VALUE: 15
PIF_VALUE: 19
PIF_VALUE: 16
PIF_VALUE: 18
PIF_VALUE: 19
PIF_VALUE: 19
PIF_VALUE: 18
PIF_VALUE: 19
PIF_VALUE: 17
PIF_VALUE: 19
PIF_VALUE: 18
PIF_VALUE: 1
PIF_VALUE: 18
PIF_VALUE: 17
PIF_VALUE: 17
PIF_VALUE: 18
PIF_VALUE: 17
PIF_VALUE: 19
PIF_VALUE: 20
PIF_VALUE: 19
PIF_VALUE: 17
PIF_VALUE: 19
PIF_VALUE: 10
PIF_VALUE: 19
PIF_VALUE: 1
PIF_VALUE: 18
PIF_VALUE: 22
PIF_VALUE: 19
PIF_VALUE: 15
PIF_VALUE: 19
PIF_VALUE: 19
PIF_VALUE: 18
PIF_VALUE: 21
PIF_VALUE: 18
PIF_VALUE: 18
PIF_VALUE: 19
PIF_VALUE: 20
PIF_VALUE: 19
PIF_VALUE: 21
PIF_VALUE: 19
PIF_VALUE: 18
PIF_VALUE: 19
PIF_VALUE: 18
PIF_VALUE: 17
PIF_VALUE: 19
PIF_VALUE: 15
PIF_VALUE: 19
PIF_VALUE: 17
PIF_VALUE: 19
PIF_VALUE: 8
PIF_VALUE: 18
PIF_VALUE: 18
PIF_VALUE: 19
PIF_VALUE: 18
PIF_VALUE: 18
PIF_VALUE: 19
PIF_VALUE: 17
PIF_VALUE: 19
PIF_VALUE: 22
PIF_VALUE: 20
PIF_VALUE: 18
PIF_VALUE: 20
PIF_VALUE: 17
PIF_VALUE: 19
PIF_VALUE: 19
PIF_VALUE: 18
PIF_VALUE: 19
PIF_VALUE: 17
PIF_VALUE: 23
PIF_VALUE: 19
PIF_VALUE: 17
PIF_VALUE: 19
PIF_VALUE: 19
PIF_VALUE: 18
PIF_VALUE: 19
PIF_VALUE: 19
PIF_VALUE: 17
PIF_VALUE: 19
PIF_VALUE: 20
PIF_VALUE: 19
PIF_VALUE: 20
PIF_VALUE: 18
PIF_VALUE: 19
PIF_VALUE: 17
PIF_VALUE: 18
PIF_VALUE: 19
PIF_VALUE: 18

## 2018-08-27 ASSESSMENT — PAIN DESCRIPTION - DESCRIPTORS
DESCRIPTORS: ACHING
DESCRIPTORS: ACHING;SHARP

## 2018-08-27 ASSESSMENT — PAIN SCALES - GENERAL
PAINLEVEL_OUTOF10: 0
PAINLEVEL_OUTOF10: 7
PAINLEVEL_OUTOF10: 8
PAINLEVEL_OUTOF10: 3
PAINLEVEL_OUTOF10: 3
PAINLEVEL_OUTOF10: 8
PAINLEVEL_OUTOF10: 7
PAINLEVEL_OUTOF10: 9
PAINLEVEL_OUTOF10: 0
PAINLEVEL_OUTOF10: 5

## 2018-08-27 ASSESSMENT — PAIN - FUNCTIONAL ASSESSMENT: PAIN_FUNCTIONAL_ASSESSMENT: 0-10

## 2018-08-27 ASSESSMENT — PAIN DESCRIPTION - LOCATION
LOCATION: KNEE
LOCATION: KNEE

## 2018-08-27 ASSESSMENT — PAIN DESCRIPTION - ORIENTATION
ORIENTATION: RIGHT
ORIENTATION: RIGHT

## 2018-08-27 ASSESSMENT — PAIN DESCRIPTION - PROGRESSION
CLINICAL_PROGRESSION: GRADUALLY WORSENING
CLINICAL_PROGRESSION: GRADUALLY WORSENING

## 2018-08-27 ASSESSMENT — PAIN DESCRIPTION - FREQUENCY
FREQUENCY: CONTINUOUS
FREQUENCY: CONTINUOUS

## 2018-08-27 ASSESSMENT — PAIN DESCRIPTION - ONSET
ONSET: ON-GOING
ONSET: ON-GOING

## 2018-08-27 ASSESSMENT — PAIN DESCRIPTION - PAIN TYPE
TYPE: SURGICAL PAIN
TYPE: SURGICAL PAIN

## 2018-08-27 NOTE — H&P
Katya Alicea    [id number]    [facility] Same Day Surgery Update H & P  Department of General Surgery   Surgical Service   Physician Assistant Pre-operative History and Physical  Last H & P within the last 30 days. DIAGNOSIS:   OSTEOARTHRITIS RIGHT KNEE    PROCEDURE:  Right Knee Medial Arthroplasty (LUIS ALFREDO)      HISTORY OF PRESENT ILLNESS:   Patient has chronic right knee pain and swelling that is unresponsive to conservative treatments. Past Medical History:        Diagnosis Date    Arthritis     right knee ?  Hyperlipidemia     Meniscus tear     Right Knee     Past Surgical History:        Procedure Laterality Date    BREAST BIOPSY Left 2003    benign lumpectomy     SECTION  1987    KNEE SURGERY Right 2014    arthroscopy    KNEE SURGERY Right 2016     RIGHT KNEE ARTHROSCOPY MEDIAL MENISCUS Freddie Velasco TONSILLECTOMY  ~3165 [facility]     Past Social History:  Social History     Social History    Marital status:      Spouse name: N/A    Number of children: N/A    Years of education: N/A     Social History Main Topics    Smoking status: Never Smoker    Smokeless tobacco: Never Used    Alcohol use 0.0 oz/week      Comment: Social    Drug use: No    Sexual activity: Not Currently      Comment:      Other Topics Concern    Not on file     Social History Narrative    ** Merged History Encounter **              Medications Prior to Admission:      Prior to Admission medications    Medication Sig Start Date End Date Taking? Authorizing Provider   rosuvastatin (CRESTOR) 20 MG tablet Take 1 tablet by mouth daily 18  Yes Aleksey Lau MD   oxyCODONE-acetaminophen (PERCOCET) 5-325 MG per tablet Take 1 tablet by mouth every 6 hours as needed for Pain for up to 7 days. Intended supply: 7 days. Take lowest dose possible to manage pain.  18  Edmund Aden MD   ibuprofen (ADVIL;MOTRIN) 200 MG tablet Take 400 mg by mouth every 6 hours as needed for Pain     Historical Provider, MD   aspirin 81 MG tablet Take 81 mg by mouth daily    Historical Provider, MD   Multiple Vitamins-Minerals (CENTRUM ADULTS PO) Take by mouth daily     Historical Provider, MD         Allergies:  Hydrocodone-acetaminophen    PHYSICAL EXAM:      BP (!) 146/88   Pulse 83   Temp 97.7 °F (36.5 °C) (Oral)   Resp 16   Ht 5' 2\" (1.575 m)   Wt 138 lb (62.6 kg)   LMP 11/19/2013   SpO2 99%   BMI 25.24 kg/m²      Heart:  regular rate and rhythm, no murmur    Lungs:  No increased work of breathing, good air exchange, clear to auscultation bilaterally, no crackles or wheezing    Abdomen:  soft, non-distended, non-tender, no rebound tenderness or guarding, normal active bowel sounds and no masses palpated    ASSESSMENT AND PLAN:    1. Patient seen and focused exam done today- no new changes since last physical exam on 8-    2. Access to ancillary services are available per request of the provider.     Zaki Atkins     8/27/2018

## 2018-08-27 NOTE — ANESTHESIA PRE PROCEDURE
History:        Diagnosis Date    Arthritis     right knee ?  Hyperlipidemia     Meniscus tear 2014    Right Knee       Past Surgical History:        Procedure Laterality Date    BREAST BIOPSY Left 2003    benign lumpectomy     SECTION  1987    KNEE SURGERY Right 2014    arthroscopy    KNEE SURGERY Right 2016     RIGHT KNEE ARTHROSCOPY MEDIAL MENISCUS Jessica Lerner TONSILLECTOMY  ~2236    [place]    TUBAL LIGATION      [place]       Social History:    Social History   Substance Use Topics    Smoking status: Never Smoker    Smokeless tobacco: Never Used    Alcohol use 0.0 oz/week      Comment: Social                                Counseling given: Not Answered      Vital Signs (Current):   Vitals:    18 0756   BP: (!) 146/88   Pulse: 83   Resp: 16   Temp: 97.7 °F (36.5 °C)   TempSrc: Oral   SpO2: 99%   Weight: 138 lb (62.6 kg)   Height: 5' 2\" (1.575 m)                                              BP Readings from Last 3 Encounters:   18 (!) 146/88   18 130/82   18 136/80       NPO Status: Time of last liquid consumption:                         Time of last solid consumption:                         Date of last liquid consumption: 18                        Date of last solid food consumption: 18    BMI:   Wt Readings from Last 3 Encounters:   18 138 lb (62.6 kg)   18 138 lb (62.6 kg)   18 137 lb (62.1 kg)     Body mass index is 25.24 kg/m².     CBC:   Lab Results   Component Value Date    WBC 4.6 2018    RBC 4.10 2018    HGB 13.0 2018    HCT 38.2 2018    MCV 93.3 2018    RDW 13.3 2018     2018       CMP:   Lab Results   Component Value Date     2018    K 4.1 2018    CL 99 2018    CO2 29 2018    BUN 11 2018    CREATININE 0.5 2018    GFRAA >60 2018    LABGLOM >60 2018    GLUCOSE 96 2018    PROT

## 2018-08-27 NOTE — ANESTHESIA POSTPROCEDURE EVALUATION
Department of Anesthesiology  Postprocedure Note    Patient: Ambreen Merida  MRN: [id number]  YOB: 1960  Date of evaluation: 8/27/2018  Time:  1:29 PM     Procedure Summary     Date:  08/27/18 Room / Location:  [facility] [address] [facility] [place] [address] / [facility] OR    Anesthesia Start:  0950 Anesthesia Stop:      Procedure:  RIGHT KNEE MEDIAL ARTHROPLASTY (LUIS ALFREDO) (Right ) Diagnosis:  (OSTEOARTHRITIS RIGHT KNEE)    Surgeon:  Adelfo Mckee MD Responsible Provider:  Ada Severance, MD    Anesthesia Type:  general ASA Status:  2          Anesthesia Type: general    Stephanie Phase I: Stephanie Score: 10    Stephanie Phase II:      Last vitals: Reviewed and per EMR flowsheets.        Anesthesia Post Evaluation    Patient location during evaluation: PACU  Patient participation: complete - patient participated  Level of consciousness: awake  Airway patency: patent  Nausea & Vomiting: no nausea  Complications: no  Cardiovascular status: hemodynamically stable  Respiratory status: acceptable  Hydration status: euvolemic

## 2018-08-27 NOTE — PROGRESS NOTES
Crutches   []Drain    [] Mychal Buckle   []Other  [x] Inpatient / significant other understands the plan for transfer to the inpatient unit

## 2018-08-27 NOTE — BRIEF OP NOTE
Brief Postoperative Note  ______________________________________________________________    Patient: Peyton Grover  YOB: 1960  MRN: [id number]  Date of Procedure: 8/27/2018    Pre-Op Diagnosis: OSTEOARTHRITIS RIGHT KNEE    Post-Op Diagnosis: Same       Procedure(s):  RIGHT KNEE MEDIAL ARTHROPLASTY (LUIS ALFREDO)    Anesthesia: General    Surgeon(s):  MD Linn Olea DO    Staff:  Surgical Assistant: Reji Douglas  Scrub Person First: Sandy Emerson     Estimated Blood Loss: 50 mL    Complications: None    Specimens:   * No specimens in log *    Implants:  * No implants in log *      Drains:      Findings: Medial compartment Osteoarthritis, no Patellofemoral or lateral compartment changes.      Betsy Parker IV, DO  Date: 8/27/2018  Time: 12:33 PM

## 2018-08-27 NOTE — PROGRESS NOTES
Patient arrived to the unit alert and oriented. Patient vital signs are WDL. Patient abdomen is soft and non tender, with +hypo bowel sounds, no complaints of N/V upon arrival and patient tolerating diet. Patient breath sounds are clear. Patient skin assessed this shift and is intact with exception of surgical incision. Patient surgical dressing is CD&I. Patient is voiding per hat, pt voiding adequately since arriving to unit. Patient has remained free from falls this shift, up with walker and assist x2 and tolerated well, pt complained of pain 7/10 after getting up to unit and 8/10 after going to the bathroom. Patient bed alarm is on, bed is in lowest position and wheels are locked. Patient room door is open patient call light is within reach.

## 2018-08-27 NOTE — PROGRESS NOTES
PACU Transfer Note    Vitals:    08/27/18 1800   BP: 139/73   Pulse: 67   Resp: 19   Temp: 97.4 °F (36.3 °C)   SpO2: 100%       In: 623 [P.O.:118; I.V.:505]  Out: -     Pain assessment:  present - adequately treated  Pain Level:  (comfortable)    Report given to Receiving unit RN - Mireya Gallo.     8/27/2018 6:03 PM

## 2018-08-28 ENCOUNTER — HOSPITAL ENCOUNTER (OUTPATIENT)
Dept: PHYSICAL THERAPY | Age: 58
Setting detail: THERAPIES SERIES
Discharge: HOME | End: 2018-08-28
Payer: COMMERCIAL

## 2018-08-28 VITALS
TEMPERATURE: 98.4 F | HEIGHT: 62 IN | SYSTOLIC BLOOD PRESSURE: 99 MMHG | BODY MASS INDEX: 25.4 KG/M2 | DIASTOLIC BLOOD PRESSURE: 65 MMHG | HEART RATE: 69 BPM | OXYGEN SATURATION: 98 % | RESPIRATION RATE: 18 BRPM | WEIGHT: 138 LBS

## 2018-08-28 DIAGNOSIS — M17.11 PRIMARY LOCALIZED OSTEOARTHROSIS OF RIGHT LOWER LEG: Primary | ICD-10-CM

## 2018-08-28 LAB
GLUCOSE BLD-MCNC: 124 MG/DL (ref 70–99)
PERFORMED ON: ABNORMAL

## 2018-08-28 PROCEDURE — G8980 MOBILITY D/C STATUS: HCPCS

## 2018-08-28 PROCEDURE — 94761 N-INVAS EAR/PLS OXIMETRY MLT: CPT

## 2018-08-28 PROCEDURE — 97161 PT EVAL LOW COMPLEX 20 MIN: CPT

## 2018-08-28 PROCEDURE — 6370000000 HC RX 637 (ALT 250 FOR IP): Performed by: ORTHOPAEDIC SURGERY

## 2018-08-28 PROCEDURE — G8989 SELF CARE D/C STATUS: HCPCS

## 2018-08-28 PROCEDURE — 97535 SELF CARE MNGMENT TRAINING: CPT

## 2018-08-28 PROCEDURE — 97165 OT EVAL LOW COMPLEX 30 MIN: CPT

## 2018-08-28 PROCEDURE — 97116 GAIT TRAINING THERAPY: CPT

## 2018-08-28 PROCEDURE — 97530 THERAPEUTIC ACTIVITIES: CPT

## 2018-08-28 PROCEDURE — G0378 HOSPITAL OBSERVATION PER HR: HCPCS

## 2018-08-28 PROCEDURE — G8988 SELF CARE GOAL STATUS: HCPCS

## 2018-08-28 PROCEDURE — 94664 DEMO&/EVAL PT USE INHALER: CPT

## 2018-08-28 PROCEDURE — G8978 MOBILITY CURRENT STATUS: HCPCS | Performed by: PHYSICAL THERAPIST

## 2018-08-28 PROCEDURE — 97016 VASOPNEUMATIC DEVICE THERAPY: CPT | Performed by: PHYSICAL THERAPIST

## 2018-08-28 PROCEDURE — G8979 MOBILITY GOAL STATUS: HCPCS

## 2018-08-28 PROCEDURE — G8978 MOBILITY CURRENT STATUS: HCPCS

## 2018-08-28 PROCEDURE — 97110 THERAPEUTIC EXERCISES: CPT | Performed by: PHYSICAL THERAPIST

## 2018-08-28 PROCEDURE — 97530 THERAPEUTIC ACTIVITIES: CPT | Performed by: PHYSICAL THERAPIST

## 2018-08-28 PROCEDURE — 2580000003 HC RX 258: Performed by: ORTHOPAEDIC SURGERY

## 2018-08-28 PROCEDURE — 6360000002 HC RX W HCPCS: Performed by: ORTHOPAEDIC SURGERY

## 2018-08-28 PROCEDURE — G8987 SELF CARE CURRENT STATUS: HCPCS

## 2018-08-28 RX ORDER — AMOXICILLIN 250 MG
2 CAPSULE ORAL DAILY PRN
Qty: 30 TABLET | Refills: 0 | COMMUNITY
Start: 2018-08-28 | End: 2018-11-01 | Stop reason: ALTCHOICE

## 2018-08-28 RX ORDER — ONDANSETRON 4 MG/1
4 TABLET, FILM COATED ORAL EVERY 8 HOURS PRN
Qty: 20 TABLET | Refills: 1 | Status: SHIPPED | OUTPATIENT
Start: 2018-08-28 | End: 2018-11-01 | Stop reason: ALTCHOICE

## 2018-08-28 RX ORDER — ASPIRIN 81 MG/1
81 TABLET ORAL 2 TIMES DAILY
Qty: 28 TABLET | Refills: 0 | COMMUNITY
Start: 2018-08-28 | End: 2019-08-27 | Stop reason: CLARIF

## 2018-08-28 RX ADMIN — ASPIRIN 325 MG: 325 TABLET, DELAYED RELEASE ORAL at 08:28

## 2018-08-28 RX ADMIN — OXYCODONE HYDROCHLORIDE 10 MG: 5 TABLET ORAL at 08:29

## 2018-08-28 RX ADMIN — HYDROMORPHONE HYDROCHLORIDE 0.5 MG: 1 INJECTION, SOLUTION INTRAMUSCULAR; INTRAVENOUS; SUBCUTANEOUS at 07:37

## 2018-08-28 RX ADMIN — ONDANSETRON HYDROCHLORIDE 4 MG: 2 INJECTION, SOLUTION INTRAMUSCULAR; INTRAVENOUS at 08:35

## 2018-08-28 RX ADMIN — HYDROMORPHONE HYDROCHLORIDE 0.5 MG: 1 INJECTION, SOLUTION INTRAMUSCULAR; INTRAVENOUS; SUBCUTANEOUS at 02:18

## 2018-08-28 RX ADMIN — OXYCODONE HYDROCHLORIDE 10 MG: 5 TABLET ORAL at 00:29

## 2018-08-28 RX ADMIN — DOCUSATE SODIUM 100 MG: 100 CAPSULE, LIQUID FILLED ORAL at 08:28

## 2018-08-28 RX ADMIN — Medication 2 G: at 05:04

## 2018-08-28 RX ADMIN — OXYCODONE HYDROCHLORIDE 10 MG: 5 TABLET ORAL at 05:19

## 2018-08-28 RX ADMIN — DEXAMETHASONE SODIUM PHOSPHATE 10 MG: 4 INJECTION, SOLUTION INTRA-ARTICULAR; INTRALESIONAL; INTRAMUSCULAR; INTRAVENOUS; SOFT TISSUE at 08:28

## 2018-08-28 RX ADMIN — OXYCODONE HYDROCHLORIDE 10 MG: 5 TABLET ORAL at 10:59

## 2018-08-28 RX ADMIN — Medication 10 ML: at 07:37

## 2018-08-28 ASSESSMENT — PAIN SCALES - GENERAL
PAINLEVEL_OUTOF10: 7
PAINLEVEL_OUTOF10: 0
PAINLEVEL_OUTOF10: 7
PAINLEVEL_OUTOF10: 5
PAINLEVEL_OUTOF10: 0
PAINLEVEL_OUTOF10: 7
PAINLEVEL_OUTOF10: 6
PAINLEVEL_OUTOF10: 8

## 2018-08-28 ASSESSMENT — PAIN DESCRIPTION - PROGRESSION
CLINICAL_PROGRESSION: NOT CHANGED

## 2018-08-28 ASSESSMENT — PAIN DESCRIPTION - ORIENTATION
ORIENTATION: RIGHT

## 2018-08-28 ASSESSMENT — PAIN DESCRIPTION - FREQUENCY
FREQUENCY: CONTINUOUS

## 2018-08-28 ASSESSMENT — PAIN DESCRIPTION - LOCATION
LOCATION: KNEE

## 2018-08-28 ASSESSMENT — PAIN DESCRIPTION - PAIN TYPE
TYPE: SURGICAL PAIN

## 2018-08-28 ASSESSMENT — PAIN DESCRIPTION - DESCRIPTORS
DESCRIPTORS: ACHING;SHARP

## 2018-08-28 ASSESSMENT — PAIN DESCRIPTION - ONSET
ONSET: ON-GOING

## 2018-08-28 NOTE — PLAN OF CARE
Problem: Falls - Risk of:  Goal: Will remain free from falls  Will remain free from falls   Outcome: Ongoing  Fall precautions in place. Bed is in lowest position, wheels locked, alarm on, non-skid socks on. Call light and bedside table within reach. Patient calls out appropriately. Patient is up x1 assist with/without a walker. Will continue to assess and monitor. Problem: Infection - Surgical Site:  Goal: Will show no infection signs and symptoms  Will show no infection signs and symptoms  Outcome: Ongoing  Ace wrap and dry dressing are clean dry and intact. Problem: Pain - Acute:  Goal: Pain level will decrease  Pain level will decrease  Outcome: Ongoing  Patient requests oral and IV pain medication PRN.

## 2018-08-28 NOTE — DISCHARGE SUMMARY
Discharge Summary  Total Knee Arthroplasty    Date:  2018    Name:  Katya Alicea  Address:  [address] [place] [address] [place] [address]    :  1960      Age:   65 y.o.    SSN:  [id number]      Medical Record Number:  [id number]    Discharge Date:  2018     Admitting Physician  Edmund Aden MD     Discharge Physician:  Octavio Flores IV, DO     Admission Diagnoses:  Osteoarthritis, localized, knee [M17.10]     Discharge Diagnoses:  Osteoarthritis, localized, knee [M17.10]     Treatments:  Right Medial Unicondylar knee arthroplasty    Hospital Course: This is a 65 y.o. female with a history of right knee DJD that failed conservative treatment. The patient elected to undergo Right Medial Unicondylar knee arthroplasty after discussing the risks, benefits, and alternatives to surgery. Right Medial Unicondylar knee arthroplasty was performed without complication. Post op their diet was advanced as tolerated. Pain was controlled with a combination of IV and PO meds. DVT prophylaxis was with a combination of SCDs and ASA. On POD #1 the patient participated in physical therapy and made appropriate progress. After evaluation by the orthopaedic surgeon and the physical therapist, discharge was allowed on POD #1. The patient was medically stable during hospitalization. Consults:   None    Disposition:  home     Discharge Condition:  Stable    Meds:  [unfilled]    Patient Instructions:    Activity: activity as tolerated  Diet: regular diet  Wound Care: keep wound clean and dry and ice to area for comfort  DVT Prophylaxis: ASA 81mg BID  Follow-up with Physical Therapy as currently scheduled    Name:  Shira Abraham Denton  Date:  2018

## 2018-08-28 NOTE — PROGRESS NOTES
Physical Therapy    Facility/Department: [facility] 5T ORTHO/NEURO  Initial Assessment/treatment note/discharge note  Late entry for 0825        NAME: Linus Morales  : 1960  MRN: [id number]    Date of Service: 2018    Discharge Recommendations:Vanna Pollack scored a 20/24 on the AM-PAC short mobility form. Current research shows that an AM-PAC score of 18 or greater is typically associated with a discharge to the patient's home setting. Based on the patients AM-PAC score and their current functional mobility deficits, it is recommended that the patient have 2-3 sessions per week of Physical Therapy at d/c to increase the patients independence. PT Equipment Recommendations  Equipment Needed: No    Patient Diagnosis(es): There were no encounter diagnoses. has a past medical history of Arthritis; Hyperlipidemia; and Meniscus tear. has a past surgical history that includes Tonsillectomy (~); knee surgery (Right, 2014); knee surgery (Right, 2016); Tubal ligation ();  section (1987); Breast biopsy (Left, ); and pr cptr-asst surgical navigation image-less (Right, 2018). Restrictions  Position Activity Restriction  Other position/activity restrictions: WBAT  RLE     Vision/Hearing  Vision: Within Functional Limits  Hearing: Within functional limits       Subjective  General  Chart Reviewed: Yes  Additional Pertinent Hx: 64 yo admitted for R Theodore knee replacement per Dr. Urbina Pap. Pmhx: OS, R knee meniscus tear.   Family / Caregiver Present: Yes (daughter present at end of session)  Diagnosis: R Theodore knee replacement  Follows Commands: Within Functional Limits  Pain Screening  Patient Currently in Pain: Yes (R knee, not rated, RN recently medicated)          Orientation  Orientation  Overall Orientation Status: Within Functional Limits    Social/Functional History  Social/Functional History  Lives With: Daughter (3 yo grandson)  [facility] Layout: One level (one step to sunken living room)  Home Access: Stairs to enter without rails (2)  Bathroom Shower/Tub: Walk-in shower  Bathroom Toilet: Standard (sink nearby)  Bathroom Equipment: Built-in shower seat  Home Equipment: Crutches  ADL Assistance: [address]: Independent (shares with daughter)  Ambulation Assistance: Independent  Transfer Assistance: Independent  Active : Yes  Occupation: Full time employment (engineering support-lots of walking)  Additional Comments: daughter able to provide initial 24 hr assist  Objective          AROM RLE (degrees)  RLE AROM:  (seated AROM R knee: 30-65 degrees)           Bed mobility  Supine to Sit: Independent     Transfers  Sit to Stand: Stand by assistance (x3 trials with vc for safety)  Stand to sit: Stand by assistance (x3 trials with vc for safety)     Ambulation  Device: Axillary Crutches  Assistance: Contact guard assistance (progressing to SBA)  Quality of Gait: slow, small steps demonstrating step to pattern leading R; WBAT R  Distance: 25 ft, 50 ft  Stairs/Curb  Stairs?:  (up/down curb step x4 with crutches CGA WBAT R)        Exercises  Comments: defer to OP PT where pt is scheduled at 0930     Assessment   Assessment: Pt demo good progress-limited by pain and c/o dizziness this am. Pt plans to go to OP PT this am (0930) and then home with 24 hour supervision of daughter. No new DME needs. Will sign off for acute PT. RN aware. Treatment Diagnosis: mobility impairment due to R Theodore knee replacment  Decision Making: Low Complexity  Patient Education: Pt educated on PT role, need to call for assist to get up, home dispo, importance of OOB mobility, WBAT R and she verbalized understanding.    REQUIRES PT FOLLOW UP: No  Activity Tolerance  Activity Tolerance: Patient Tolerated treatment well;Patient limited by endurance (c/o dizziness with mobility-B/P 120/80 and RN aware)         Plan   Safety Devices  Type of devices: Nurse

## 2018-08-28 NOTE — CONSULTS
Nutrition Education    Type and Reason for Visit:  Consult    Subjective Assessment:   Consult received for diet education if BMI is greater than 35 kg/(m^2). No nutritional/ weight loss education planned at this time. Subjective:  RD consult for Diet modification for weight loss management if BMI >35. Pt w/ BMI of 25.3. No nutrition intervention indicated. RD will follow per standards of care. Nutrition Intervention: Education Not Indicated    Anthropometric Measures:  · Ht: 5' 2\" (157.5 cm)  · Current body Weight: 138 lb (62.6 kg) = Body mass index is 25.24 kg/m². Refer to Patient Education activity for more details.     SUZE YOUNG, LD  Pager:  [phone]  Office:  [phone]

## 2018-08-28 NOTE — OP NOTE
instrumentation and patient  positioning. OPERATIVE PROCEDURE  This patient was placed in supine position upon the  operating table and after satisfactory level of general anesthesia, the  right knee was prepped and draped to sterile surgical field after  identification of the signed limb and the time-out procedure. I had previously performed the operative procedure on the computer and as  previously indicated did perform all of the sizing. The first operative procedure consisted of the medial arthrotomy under  tourniquet control. Inspection at this time did demonstrate good articular  cartilage to the patellofemoral joint, which is certainly an advancement  because she occasionally would have some clicking there, but there is no  areas of even 2B articular cartilage fragmentation. In slight areas, there  is very slight stretching scuffing but this is not marked at all. The  lateral compartment also looked normal.  Medial compartment was severely  arthritic. Accordingly, this confirmed that she was an excellent candidate  for unicompartmental replacement. The next procedure consisted of placement of the tibial and femoral osseous  pins and the arrays. Next, the registration of the [facility] was performed. First, the registration of the hip with the hip motion followed by the  medial and lateral malleolus. Next, the tibial and femoral pins were  placed for guidance. Next, the visualization involved the multiple  sequences following the computerized program for the femur and  verification. Next, the tibia multiple points were registered into the computer and along  with the verification process. Next, the gap analysis was performed and appropriately adjusted. This was  certainly a very important part of the case and we did have the gap within  1 mm throughout knee flexion. Next, the femur was transected with the felecia following the computerized  program with two sockets placed.   Extensive

## 2018-08-28 NOTE — PLAN OF CARE
[facility]   Physical Therapy Re-Certification Plan of Care    Dear  Dr. Timo Hernandez,    We had the pleasure of treating the following patient for physical therapy services at [address] [place] [address]. A summary of our findings can be found in the updated assessment below. This includes our plan of care. If you have any questions or concerns regarding these findings, please do not hesitate to contact me at [phone]. Thank you for the referral.     Physician Signature:________________________________Date:__________________  By signing above (or electronic signature), therapists plan is approved by physician      Overall Response to Treatment:   []Patient is responding well to treatment and improvement is noted with regards  to goals   []Patient should continue to improve in reasonable time if they continue HEP   []Patient has plateaued and is no longer responding to skilled PT intervention    []Patient is getting worse and would benefit from return to referring MD   []Patient unable to adhere to initial POC   [x]Other: Pt is now post-op following R medial LUIS ALFREDO. Doing okay today. No concerning symptoms. Initial ROM is good, especially into flexion. Gait is altered d/t pain and ROM restrictions. Pt had fair quad activation and control, able to maneuver around with Abbe. Pain is most limiting factor today. Pt demonstrated good understanding of PO instructions and HEP. Pt would benefit from regular skilled PT follow-up to address large deficits in ROM, strength, swelling, fucntional mobility and gait. These deficits are preventing her from participating in normal ADLs, IADLs and work.      Date range of Visits: 18-18  Total Visits: 2    Physical Therapy Daily Treatment Note  Date:  2018    Patient Name:  Mario Isidro    :  1960  MRN: [id number]  Restrictions/Precautions:    Medical/Treatment Diagnosis Information:  · Diagnosis: M17.11 (ICD-10-CM) - Primary osteoarthritis of right knee  · Treatment Diagnosis: Pt presents with s/s consistent with MD diagnosis. Currently has mildly decreased ROM, weakness of entire LE, altered gait and decreased functional mobility. · S/P R Medial PKA LUIS ALFREDO 8/27/18  · Meds: Percocet, Baby Aspirin BID   Insurance/Certification information:  PT Insurance Information: PT BENEFITS 2018/ [employer]/ ACTIVE/ DED 6400 MET 2600.11/ OOP 12,800/ PAYS 100%/ 40 VPCY COMB/ O AUTH REQ/ REF# [id number]/ 8-21-18 CB/   Physician Information:  Referring Practitioner: Soren Mosley  Plan of care signed (Y/N): pending    Date of Patient follow up with Physician: 3, 6 and 12 weeks PO (needs x-ray)  Patient seen in consultation with Dr. Andreina Starks who established the initial/subsequent treatment protocol. 8-28-18: reviewed surgery and shown pics, surgery went well, very pleased with fit    G-Code (if applicable):      Date G-Code Applied: 8/28/18   PT G-Codes  Functional Assessment Tool Used: LEFS post-op  Score: 96% deficit  Functional Limitation: Mobility: Walking and moving around  Mobility: Walking and Moving Around Current Status (): At least 80 percent but less than 100 percent impaired, limited or restricted    Progress Note: [x]  Yes  []  No  Next due by: Visit #10       Latex Allergy:  [x]NO      []YES  Preferred Language for Healthcare:   [x]English       []other:    Visit # Insurance Allowable   2 40 VPCY comb     Pain level:  8/10 currently; 5/10 earlier today     SUBJECTIVE:  Pt reports vomiting last night but nothing today. Denies nausea currently but did have some this morning. Denies calf pain, fever. Has been doing HEP provided at pre-op visit.      OBJECTIVE:    8/28/18            ROM PROM AROM Overpressure Comment     L R L R L R     Flexion 143 90             Extension +3 -8 +5 0                                                8/23/18  Strength L R Comment   Quad 4+ 4     Hamstring 4 4-     Gastroc NT NT     Hip  flexion 4+ 4+     Hip abd 4 4                            8/28/18  Special Test Results/Comment   Homans Neg   Temp Neg      8/23/18  Girth L R   Mid Patella 39.2 40.4   Suprapatellar 39.8 41.6   5cm above 43.7 44.7   15cm above          TEST INITIAL 8/23/18   GOAL   SINGLE LEG STANCE TIME L: 30\"+     R: 30\"+   >25 SECONDS         Reflexes/Sensation:               [x]Dermatomes/Myotomes intact               [x]Reflexes equal and normal bilaterally              []Other:     Joint mobility:                [x]Normal               []Hypo              []Hyper     Palpation: generalized TTP of entire knee 8/28/18     Functional Mobility/Transfers: mod Ind     Posture: good     Bandages/Dressings/Incisions: two small incisions just superior to long incision and just inferior to long incision; aquacel in place over long incision with no visible drainage seen through bandage; bruising medially 8/28/18     Gait: (include devices/WB status) Stiff-legged, flexed knee posture, antalgic, B/L crutches 8/28/18          RESTRICTIONS/PRECAUTIONS: none    Exercises/Interventions:   Exercise/Equipment Resistance/Repetitions Other comments   Stretching     Hamstring 5 x 30\" Towel roll under knee   Towel Pull 5 x 30\" Towel roll under knee   Inclined Calf     Hip Flexion     ITB     Groin     Quad                    SLR     Supine     Abduction     Adduction 10 x 10\"    Prone     SLR+          Isometrics     Quad sets 10 x 10\"         Patellar Glides     Medial     Superior     Inferior          ROM     Sheet Pulls     Hang Weights     Passive 10 x 10\" EOB self-mob   Active     Weight Shift     Ankle Pumps 30x                   CKC     Calf raises     Wall sits     Step ups     1 leg stand     Squatting     CC TKE     Balance     bridges          PRE     Extension  RANGE:   Flexion  RANGE:        Quantum machines     Leg press      Leg extension     Leg curl          Manual interventions          Removal of PO bandage  15'          Therapeutic Exercise and NMR EXR  [x] (15104) Provided verbal/tactile cueing for activities related to strengthening, flexibility, endurance, ROM for improvements in LE, proximal hip, and core control with self care, mobility, lifting, ambulation.  [] (31386) Provided verbal/tactile cueing for activities related to improving balance, coordination, kinesthetic sense, posture, motor skill, proprioception  to assist with LE, proximal hip, and core control in self care, mobility, lifting, ambulation and eccentric single leg control.      NMR and Therapeutic Activities:    [x] (05620 or 65457) Provided verbal/tactile cueing for activities related to improving balance, coordination, kinesthetic sense, posture, motor skill, proprioception and motor activation to allow for proper function of core, proximal hip and LE with self care and ADLs  [] (91619) Gait Re-education- Provided training and instruction to the patient for proper LE, core and proximal hip recruitment and positioning and eccentric body weight control with ambulation re-education including up and down stairs     Home Exercise Program:    [x] (99155) Reviewed/Progressed HEP activities related to strengthening, flexibility, endurance, ROM of core, proximal hip and LE for functional self-care, mobility, lifting and ambulation/stair navigation   [] (42776)Reviewed/Progressed HEP activities related to improving balance, coordination, kinesthetic sense, posture, motor skill, proprioception of core, proximal hip and LE for self care, mobility, lifting, and ambulation/stair navigation      Manual Treatments:  PROM / STM / Oscillations-Mobs:  G-I, II, III, IV (PA's, Inf., Post.)  [] (68919) Provided manual therapy to mobilize LE, proximal hip and/or LS spine soft tissue/joints for the purpose of modulating pain, promoting relaxation,  increasing ROM, reducing/eliminating soft tissue swelling/inflammation/restriction, improving soft tissue

## 2018-08-28 NOTE — PROGRESS NOTES
[address]  Progress Note  Total Knee Arthroplasty     Date:  2018    Name:  Julianna Jaime  Address:  [address] [place] [address] [address] [address]    :  1960      Age:   65 y.o.    SSN:  [id number]      Medical Record Number:  [id number]    SUBJECTIVE:    Julianna Jaime is a 65y.o. year old female who is now POD # 1  s/p Right Medial Unicondylar knee arthroplasty. Pain was well controlled overnight. Doing well with ambulation. There are no new complaints this AM.     OBJECTIVE:  Infusions:   sodium chloride      lactated ringers      dextrose         I/O:I/O last 3 completed shifts: In: 7458 [P.O.:118; I.V.:2105]  Out: 2755 [Urine:2400; Blood:25]           Wt Readings from Last 1 Encounters:   18 138 lb (62.6 kg)        LABS:  No results for input(s): WBC, HGB, HCT, MCV, PLT in the last 72 hours. No results for input(s): NA, K, CL, CO2, PHOS, BUN, CREATININE in the last 72 hours. Invalid input(s): CA   No results for input(s): AST, ALT, ALB, BILIDIR, BILITOT, ALKPHOS in the last 72 hours. No results for input(s): LIPASE, AMYLASE in the last 72 hours. No results for input(s): PROT, INR, APTT in the last 72 hours. No results for input(s): CKTOTAL, CKMB, CKMBINDEX, TROPONINI in the last 72 hours. Exam:/70   Pulse 61   Temp 97.7 °F (36.5 °C) (Oral)   Resp 16   Ht 5' 2\" (1.575 m)   Wt 138 lb (62.6 kg)   LMP 2013   SpO2 96%   BMI 25.24 kg/m²    General appearance: Alert, oriented, NAD, and cooperative   Lungs: Breathing is unlabored, no audible wheezes   Heart: Regular rate and rhythm   Abdomen: soft, nontender   Neuro: Sensation is intact throughout foot. Vascular: Toes are well perfused. Right knee exam: dressing is clean, dry, and intact. Compartments soft and NT. 5+     DF/PF strength. 2+ palp PT/DP pulses. Calf soft and non-tender with negative  homans. NVI.     ASSESSMENT:   POD # 1 Right Medial

## 2018-08-31 ENCOUNTER — HOSPITAL ENCOUNTER (OUTPATIENT)
Dept: PHYSICAL THERAPY | Age: 58
Setting detail: THERAPIES SERIES
Discharge: HOME | End: 2018-08-31
Payer: COMMERCIAL

## 2018-08-31 PROCEDURE — 97140 MANUAL THERAPY 1/> REGIONS: CPT | Performed by: PHYSICAL THERAPIST

## 2018-08-31 PROCEDURE — 97110 THERAPEUTIC EXERCISES: CPT | Performed by: PHYSICAL THERAPIST

## 2018-08-31 PROCEDURE — 97016 VASOPNEUMATIC DEVICE THERAPY: CPT | Performed by: PHYSICAL THERAPIST

## 2018-08-31 NOTE — FLOWSHEET NOTE
verbal/tactile cueing for activities related to strengthening, flexibility, endurance, ROM for improvements in LE, proximal hip, and core control with self care, mobility, lifting, ambulation.  [] (41456) Provided verbal/tactile cueing for activities related to improving balance, coordination, kinesthetic sense, posture, motor skill, proprioception  to assist with LE, proximal hip, and core control in self care, mobility, lifting, ambulation and eccentric single leg control.      NMR and Therapeutic Activities:    [x] (78675 or 79990) Provided verbal/tactile cueing for activities related to improving balance, coordination, kinesthetic sense, posture, motor skill, proprioception and motor activation to allow for proper function of core, proximal hip and LE with self care and ADLs  [] (22011) Gait Re-education- Provided training and instruction to the patient for proper LE, core and proximal hip recruitment and positioning and eccentric body weight control with ambulation re-education including up and down stairs     Home Exercise Program:    [x] (04899) Reviewed/Progressed HEP activities related to strengthening, flexibility, endurance, ROM of core, proximal hip and LE for functional self-care, mobility, lifting and ambulation/stair navigation   [] (92473)Reviewed/Progressed HEP activities related to improving balance, coordination, kinesthetic sense, posture, motor skill, proprioception of core, proximal hip and LE for self care, mobility, lifting, and ambulation/stair navigation      Manual Treatments:  PROM / STM / Oscillations-Mobs:  G-I, II, III, IV (PA's, Inf., Post.)  [x] (20469) Provided manual therapy to mobilize LE, proximal hip and/or LS spine soft tissue/joints for the purpose of modulating pain, promoting relaxation,  increasing ROM, reducing/eliminating soft tissue swelling/inflammation/restriction, improving soft tissue extensibility and allowing for proper ROM for normal function with self care, mobility, lifting and ambulation. Modalities:  Vaso 15'    Charges:  Timed Code Treatment Minutes: 39'   Total Treatment Minutes: 61'       [] EVAL (LOW) 75320 (typically 20 minutes face-to-face)  [] EVAL (MOD) 72883 (typically 30 minutes face-to-face)  [] EVAL (HIGH) 84824 (typically 45 minutes face-to-face)  [] RE-EVAL   [x] MQ(07119) x  2   [] IONTO  [] NMR (90131) x      [x] VASO  [x] Manual (51024) x  1    [] Other:  [] TA x       [] Mech Traction (13711)  [] ES(attended) (39809)      [] ES (un) (05712):     GOALS:   Patient stated goal: Be able to walk long distances, carry loads and do stairs without pain.      Therapist goals for Patient:   Short Term Goals: To be achieved in: 2 weeks  1. Independent in HEP and progression per patient tolerance, in order to prevent re-injury. 2. Patient will have a decrease in pain to facilitate improvement in movement, function, and ADLs as indicated by Functional Deficits.     Long Term Goals: To be achieved in: 12 weeks  1. Disability index score of 30% or less for the LEFS to assist with reaching prior level of function. 2. Patient will demonstrate increased AROM to 0-135 deg to allow for proper joint functioning as indicated by patients Functional Deficits. 3. Patient will demonstrate an increase in Strength to good proximal hip strength and control in LE to allow for proper functional mobility as indicated by patients Functional Deficits. 4. Patient will return to household functional activities without increased symptoms or restriction. 5. Pt will be able to walk for 15 min without restriction in order to complete normal work duties. (patient specific functional goal)             Progression Towards Functional goals:  [] Patient is progressing as expected towards functional goals listed. [] Progression is slowed due to complexities listed. [] Progression has been slowed due to co-morbidities.   [x] Plan just implemented, too soon to assess goals

## 2018-09-05 ENCOUNTER — HOSPITAL ENCOUNTER (OUTPATIENT)
Dept: PHYSICAL THERAPY | Age: 58
Setting detail: THERAPIES SERIES
Discharge: HOME | End: 2018-09-05
Payer: COMMERCIAL

## 2018-09-05 PROCEDURE — G0283 ELEC STIM OTHER THAN WOUND: HCPCS | Performed by: PHYSICAL THERAPIST

## 2018-09-05 PROCEDURE — 97016 VASOPNEUMATIC DEVICE THERAPY: CPT | Performed by: PHYSICAL THERAPIST

## 2018-09-05 PROCEDURE — 97140 MANUAL THERAPY 1/> REGIONS: CPT | Performed by: PHYSICAL THERAPIST

## 2018-09-05 PROCEDURE — 97110 THERAPEUTIC EXERCISES: CPT | Performed by: PHYSICAL THERAPIST

## 2018-09-05 NOTE — FLOWSHEET NOTE
[facility] [address]     Physical Therapy Daily Treatment Note  Date:  2018    Patient Name:  Torrie Luke    :  1960  MRN: [id number]  Restrictions/Precautions:    Medical/Treatment Diagnosis Information:  · Diagnosis: M17.11 (ICD-10-CM) - Primary osteoarthritis of right knee  · Treatment Diagnosis: Pt presents with s/s consistent with MD diagnosis. Currently has mildly decreased ROM, weakness of entire LE, altered gait and decreased functional mobility. · S/P R Medial PKA LUIS ALFREDO 18  · Meds: Percocet, Baby Aspirin BID; Advil vs Aleve PRN (GI precautions)   Insurance/Certification information:  PT Insurance Information: PT BENEFITS 2018/ [employer]/ ACTIVE/ DED 6400 MET 2600.11/ OOP 12,800/ PAYS 100%/ 40 VPCY COMB/ O AUTH REQ/ REF# [id number]/ 18 CB/   Physician Information:  Referring Practitioner: Ronal Jimenez  Plan of care signed (Y/N): pending    Date of Patient follow up with Physician: 3, 6 and 12 weeks PO (needs x-ray)  Patient seen in consultation with Dr. Fifi Mccallum who established the initial/subsequent treatment protocol. 18: reviewed surgery and shown pics, surgery went well, very pleased with fit    G-Code (if applicable):      Date G-Code Applied: 18        Progress Note: [x]  Yes  []  No  Next due by: Visit #10       Latex Allergy:  [x]NO      []YES  Preferred Language for Healthcare:   [x]English       []other:    Visit # Insurance Allowable   4 40 VPCY comb     Pain level:  4/10 @ rest;  7/ 10 @ worst (noted anterior/ central joint during QS with the NMES)     SUBJECTIVE:  States stiffness with 2 hour drive from [email or web address]. States anterior/ central knee pain very similar to preop pain. Pt feels like she turned a corner yesterday. Pain lower and more tolerant to activity/exercises.    (-) giving way  (+) night pain/ stiffness; knee is warm; can't get comfortable  (-) popping/ 1x on L hip  (-)catching/locking  (+) swelling  (+) stiffness/ prolonged position  (+) stairs/   (NO) kneeling/squatting      OBJECTIVE:    9/5/18            ROM PROM AROM Overpressure Comment     L R L R L R     Flexion 143 99          ERMI   Extension +3 -6 +5                                                 9/5/18  Strength L R Comment   Quad 4+ 2- JOSH 0°   Hamstring       Gastroc       Hip  flexion       Hip abd                              9/5/18  Special Test Results/Comment   Homans Neg   Temp Neg      8/23/18  Girth L R   Mid Patella 39.2 40.4   Suprapatellar 39.8 41.6   5cm above 43.7 44.7   15cm above          TEST INITIAL 8/23/18   GOAL   SINGLE LEG STANCE TIME L: 30\"+     R: 30\"+   >25 SECONDS         Reflexes/Sensation:               [x]Dermatomes/Myotomes intact               [x]Reflexes equal and normal bilaterally              []Other:     Joint mobility:                [x]Normal               []Hypo              []Hyper     Palpation: generalized TTP of entire knee 8/28/18     Functional Mobility/Transfers: Independent with floor to table transfers     Posture: good     Bandages/Dressings/Incisions: Steri strips intact on guide pin incisions;  Aquacell dressing removed with cleaning of wound and new steri strips applied; healing well; (-) redness     Gait:  Stiff-legged, flexed knee posture, antalgic, B/L crutches 8/28/18          RESTRICTIONS/PRECAUTIONS: none    Exercises/Interventions:   Exercise/Equipment Resistance/Repetitions Other comments   Stretching     Hamstring 5 x 30\"    Towel Pull 5 x 30\"    Inclined Calf     Hip Flexion     ITB     Groin     Quad                    SLR     Supine 3x 10    Abduction 3 x 10    Adduction 2 x 10\"x 10    Prone     SLR+          Isometrics     Quad sets 15 x 10\"         Patellar Glides     Medial 2'    Superior 2'    Inferior 2'         ROM     Sheet Pulls     Hang Weights     Passive 10 x 10\" EOB self-mob; manual   Active 10x KEXT   Weight Shift     Ankle Pumps 3 x 10 Gray TB   ERMI 5 x 60' CKC     Calf raises     Wall sits     Step ups     1 leg stand     Squatting     CC TKE     Balance     bridges          PRE     Extension  RANGE:   Flexion  RANGE:        Quantum machines     Leg press      Leg extension     Leg curl          Manual interventions          Removal of PO bandage            Therapeutic Exercise and NMR EXR  [x] (87875) Provided verbal/tactile cueing for activities related to strengthening, flexibility, endurance, ROM for improvements in LE, proximal hip, and core control with self care, mobility, lifting, ambulation. [x] (64565) Provided verbal/tactile cueing for activities related to improving balance, coordination, kinesthetic sense, posture, motor skill, proprioception  to assist with LE, proximal hip, and core control in self care, mobility, lifting, ambulation and eccentric single leg control.      NMR and Therapeutic Activities:    [x] (33785 or 95507) Provided verbal/tactile cueing for activities related to improving balance, coordination, kinesthetic sense, posture, motor skill, proprioception and motor activation to allow for proper function of core, proximal hip and LE with self care and ADLs  [] (30912) Gait Re-education- Provided training and instruction to the patient for proper LE, core and proximal hip recruitment and positioning and eccentric body weight control with ambulation re-education including up and down stairs     Home Exercise Program:    [x] (25442) Reviewed/Progressed HEP activities related to strengthening, flexibility, endurance, ROM of core, proximal hip and LE for functional self-care, mobility, lifting and ambulation/stair navigation   [x] (64169)Reviewed/Progressed HEP activities related to improving balance, coordination, kinesthetic sense, posture, motor skill, proprioception of core, proximal hip and LE for self care, mobility, lifting, and ambulation/stair navigation      Manual Treatments:  PROM / STM / Oscillations-Mobs:  G-I, II, III, IV (Desiree, Inf., Post.)  [x] (09440) Provided manual therapy to mobilize LE, proximal hip and/or LS spine soft tissue/joints for the purpose of modulating pain, promoting relaxation,  increasing ROM, reducing/eliminating soft tissue swelling/inflammation/restriction, improving soft tissue extensibility and allowing for proper ROM for normal function with self care, mobility, lifting and ambulation. Modalities:  NMES 15'; Vaso 15'    Charges:  Timed Code Treatment Minutes: 50'   Total Treatment Minutes: 66'       [] EVAL (LOW) 27280 (typically 20 minutes face-to-face)  [] EVAL (MOD) 31516 (typically 30 minutes face-to-face)  [] EVAL (HIGH) 87170 (typically 45 minutes face-to-face)  [] RE-EVAL   [x] CB(88185) x  2   [] IONTO  [] NMR (13209) x      [x] VASO  [x] Manual (99089) x  1    [] Other:  [] TA x       [] Mech Traction (92938)  [] ES(attended) (68680)      [x] ES (un) (86779):     GOALS:   Patient stated goal: Be able to walk long distances, carry loads and do stairs without pain.      Therapist goals for Patient:   Short Term Goals: To be achieved in: 2 weeks  1. Independent in HEP and progression per patient tolerance, in order to prevent re-injury. 2. Patient will have a decrease in pain to facilitate improvement in movement, function, and ADLs as indicated by Functional Deficits.     Long Term Goals: To be achieved in: 12 weeks  1. Disability index score of 30% or less for the LEFS to assist with reaching prior level of function. 2. Patient will demonstrate increased AROM to 0-135 deg to allow for proper joint functioning as indicated by patients Functional Deficits. 3. Patient will demonstrate an increase in Strength to good proximal hip strength and control in LE to allow for proper functional mobility as indicated by patients Functional Deficits. 4. Patient will return to household functional activities without increased symptoms or restriction.    5. Pt will be able to walk for 15 min without restriction in order to complete normal work duties. (patient specific functional goal)             Progression Towards Functional goals:  [] Patient is progressing as expected towards functional goals listed. [] Progression is slowed due to complexities listed. [] Progression has been slowed due to co-morbidities. [x] Plan just implemented, too soon to assess goals progression  [] Other:     ASSESSMENT:  Pt progressing very well. Reduced symptoms, improved ROM, improved quad activation and control. Had increased pain with ROM, QS and SLR. Pt continues to be walking stiff-legged and mildly antalgic with B/L crutches but comfortable taking a few steps without crutches in a controlled environment. Pt has been compliant with HEP to date.     Treatment/Activity Tolerance:  [] Patient tolerated treatment well [x] Patient limited by fatique  [x] Patient limited by pain  [] Patient limited by other medical complications  [x] Other: Moderate soreness/ pain with prolonged position of knee extended (mild extension tightness)    Patient education:  8/23/18: full pre-op instructions  8/28/18: full post-op instructions    Prognosis: [x] Good [] Fair  [] Poor    Patient Requires Follow-up: [x] Yes  [] No    PLAN: See eval  [x] Continue per plan of care [] Alter current plan (see comments)  [] Plan of care initiated [] Hold pending MD visit [] Discharge    Electronically signed by: Taylor Kearney PT

## 2018-09-07 ENCOUNTER — HOSPITAL ENCOUNTER (OUTPATIENT)
Dept: PHYSICAL THERAPY | Age: 58
Setting detail: THERAPIES SERIES
Discharge: HOME | End: 2018-09-07
Payer: COMMERCIAL

## 2018-09-07 PROCEDURE — 97110 THERAPEUTIC EXERCISES: CPT | Performed by: PHYSICAL THERAPIST

## 2018-09-07 PROCEDURE — 97140 MANUAL THERAPY 1/> REGIONS: CPT | Performed by: PHYSICAL THERAPIST

## 2018-09-07 PROCEDURE — 97016 VASOPNEUMATIC DEVICE THERAPY: CPT | Performed by: PHYSICAL THERAPIST

## 2018-09-07 NOTE — FLOWSHEET NOTE
Extension  RANGE:   Flexion  RANGE:        Quantum machines     Leg press      Leg extension     Leg curl          Manual interventions          Removal of PO bandage            Therapeutic Exercise and NMR EXR  [x] (53568) Provided verbal/tactile cueing for activities related to strengthening, flexibility, endurance, ROM for improvements in LE, proximal hip, and core control with self care, mobility, lifting, ambulation. [x] (79812) Provided verbal/tactile cueing for activities related to improving balance, coordination, kinesthetic sense, posture, motor skill, proprioception  to assist with LE, proximal hip, and core control in self care, mobility, lifting, ambulation and eccentric single leg control.      NMR and Therapeutic Activities:    [x] (93135 or 00001) Provided verbal/tactile cueing for activities related to improving balance, coordination, kinesthetic sense, posture, motor skill, proprioception and motor activation to allow for proper function of core, proximal hip and LE with self care and ADLs  [] (61875) Gait Re-education- Provided training and instruction to the patient for proper LE, core and proximal hip recruitment and positioning and eccentric body weight control with ambulation re-education including up and down stairs     Home Exercise Program:    [x] (17664) Reviewed/Progressed HEP activities related to strengthening, flexibility, endurance, ROM of core, proximal hip and LE for functional self-care, mobility, lifting and ambulation/stair navigation   [x] (33691)Reviewed/Progressed HEP activities related to improving balance, coordination, kinesthetic sense, posture, motor skill, proprioception of core, proximal hip and LE for self care, mobility, lifting, and ambulation/stair navigation      Manual Treatments:  PROM / STM / Oscillations-Mobs:  G-I, II, III, IV (PA's, Inf., Post.)  [x] (57055) Provided manual therapy to mobilize LE, proximal hip and/or LS spine soft tissue/joints for the goals:  [] Patient is progressing as expected towards functional goals listed. [] Progression is slowed due to complexities listed. [] Progression has been slowed due to co-morbidities. [x] Plan just implemented, too soon to assess goals progression  [] Other:     ASSESSMENT:  Pt tolerated treatment well this date. Continuing progression in ROM. Pt still experiences increased pain with heel prop exercise. Pt educated on importance of gaining extension ROM early in rehab process and to continue trying to increase prop time. Pt also instructed on proper ambulation using single crutch opposite the affected side. Pt demonstrated improvements in gait with this pattern. Pt instructed to continue HEP; all pt questions answered. Treatment/Activity Tolerance:  [x] Patient tolerated treatment well [x] Patient limited by fatique  [x] Patient limited by pain  [] Patient limited by other medical complications  [x] Other: Moderate soreness/ pain with prolonged position of knee extended (mild extension tightness)    Patient education:  8/23/18: full pre-op instructions  8/28/18: full post-op instructions    Prognosis: [x] Good [] Fair  [] Poor    Patient Requires Follow-up: [x] Yes  [] No    PLAN: See eval  [x] Continue per plan of care [] Alter current plan (see comments)  [] Plan of care initiated [] Hold pending MD visit [] Discharge    Electronically signed by: Laila Cloud [facility]  Therapist was present, directed the patient's care, made skilled judgement, and was responsible for assessment and treatment of the patient.

## 2018-09-11 ENCOUNTER — OFFICE VISIT (OUTPATIENT)
Dept: ORTHOPEDIC SURGERY | Age: 58
End: 2018-09-11

## 2018-09-11 ENCOUNTER — HOSPITAL ENCOUNTER (OUTPATIENT)
Dept: PHYSICAL THERAPY | Age: 58
Setting detail: THERAPIES SERIES
Discharge: HOME | End: 2018-09-11
Payer: COMMERCIAL

## 2018-09-11 VITALS
DIASTOLIC BLOOD PRESSURE: 80 MMHG | WEIGHT: 136 LBS | BODY MASS INDEX: 25.03 KG/M2 | HEART RATE: 70 BPM | SYSTOLIC BLOOD PRESSURE: 127 MMHG | HEIGHT: 62 IN

## 2018-09-11 DIAGNOSIS — M25.561 RIGHT KNEE PAIN, UNSPECIFIED CHRONICITY: Primary | ICD-10-CM

## 2018-09-11 DIAGNOSIS — Z96.651 STATUS POST RIGHT PARTIAL KNEE REPLACEMENT: ICD-10-CM

## 2018-09-11 PROCEDURE — 97016 VASOPNEUMATIC DEVICE THERAPY: CPT | Performed by: PHYSICAL THERAPIST

## 2018-09-11 PROCEDURE — 97530 THERAPEUTIC ACTIVITIES: CPT | Performed by: PHYSICAL THERAPIST

## 2018-09-11 PROCEDURE — 97110 THERAPEUTIC EXERCISES: CPT | Performed by: PHYSICAL THERAPIST

## 2018-09-11 PROCEDURE — 99024 POSTOP FOLLOW-UP VISIT: CPT | Performed by: ORTHOPAEDIC SURGERY

## 2018-09-11 NOTE — FLOWSHEET NOTE
Provided manual therapy to mobilize LE, proximal hip and/or LS spine soft tissue/joints for the purpose of modulating pain, promoting relaxation,  increasing ROM, reducing/eliminating soft tissue swelling/inflammation/restriction, improving soft tissue extensibility and allowing for proper ROM for normal function with self care, mobility, lifting and ambulation. Modalities Vaso 15'    Charges:  Timed Code Treatment Minutes: 45'   Total Treatment Minutes: 76'       [] EVAL (LOW) 455 1011 (typically 20 minutes face-to-face)  [] EVAL (MOD) 16968 (typically 30 minutes face-to-face)  [] EVAL (HIGH) 16210 (typically 45 minutes face-to-face)  [] RE-EVAL   [x] DO(84807) x  2   [] IONTO  [] NMR (57302) x      [x] VASO  [] Manual (31311) x       [] Other:  [x] TA x  1    [] Mech Traction (50474)  [] ES(attended) (34348)      [] ES (un) (60108):     GOALS:   Patient stated goal: Be able to walk long distances, carry loads and do stairs without pain.      Therapist goals for Patient:   Short Term Goals: To be achieved in: 2 weeks  1. Independent in HEP and progression per patient tolerance, in order to prevent re-injury. 2. Patient will have a decrease in pain to facilitate improvement in movement, function, and ADLs as indicated by Functional Deficits.     Long Term Goals: To be achieved in: 12 weeks  1. Disability index score of 30% or less for the LEFS to assist with reaching prior level of function. 2. Patient will demonstrate increased AROM to 0-135 deg to allow for proper joint functioning as indicated by patients Functional Deficits. 3. Patient will demonstrate an increase in Strength to good proximal hip strength and control in LE to allow for proper functional mobility as indicated by patients Functional Deficits. 4. Patient will return to household functional activities without increased symptoms or restriction.    5. Pt will be able to walk for 15 min without restriction in order to complete normal work

## 2018-09-11 NOTE — PROGRESS NOTES
Chief Complaint    Post-Op Check (3 week check on right knee)      History of Present Illness:  Saskia Vincent is a 65 y.o. female who presents for follow up of her right knee. Patient underwent a right medial patellofemoral compartment replacement using Theodore technology on 2018. She is currently 3 weeks postop. Overall she reports that her pain levels are improving. She continues to go to physical therapy and wear her MELONIE hose. She denies any fevers, chills or calf tenderness. Past Medical History:   Diagnosis Date    Arthritis     right knee ?     Hyperlipidemia     Meniscus tear     Right Knee        Past Surgical History:   Procedure Laterality Date    BREAST BIOPSY Left 2003    benign lumpectomy     SECTION  1987    KNEE SURGERY Right 2014    arthroscopy    KNEE SURGERY Right 2016     RIGHT KNEE ARTHROSCOPY MEDIAL MENISCUS EXCISION,PATELLA-    IA CPTR-ASST SURGICAL NAVIGATION IMAGE-LESS Right 2018    RIGHT KNEE MEDIAL ARTHROPLASTY (THEODORE) performed by Deanna Soriano MD at [address]  ~[address]698    [facility]       Family History   Problem Relation Age of Onset    Heart Disease Mother     Stroke Father     Cancer Father         Prostate Cancer    Asthma Sister     Depression Brother     No Known Problems Sister     Lung Cancer Maternal Grandmother     No Known Problems Maternal Grandfather     No Known Problems Paternal Grandmother     No Known Problems Paternal Grandfather     Arthritis Neg Hx     Birth Defects Neg Hx     Diabetes Neg Hx     Early Death Neg Hx     Hearing Loss Neg Hx     High Blood Pressure Neg Hx     High Cholesterol Neg Hx     Kidney Disease Neg Hx     Learning Disabilities Neg Hx     Mental Illness Neg Hx     Mental Retardation Neg Hx     Miscarriages / Stillbirths Neg Hx     Substance Abuse Neg Hx     Vision Loss Neg Hx     Other Neg Hx        Social History skin lesions, cellulitis, or extreme edema in the lower extremities. Sensation is grossly intact to light touch bilaterally lower extremity. The patient has warm and well-perfused Bilateral lower extremities with brisk capillary refill. Inspection:  Anterior knee incision is clean dry and intact and well approximated,  no gross deformities, no signs of infection. Palpation: There is no patellofemoral crepitus, her calf is soft and nontender without any signs of DVT    Range of Motion:  0100 degrees of flexion    Strength:  Improving quad strength    Special Tests:   No ligament instability     Gait:  antalgic    Radiology:     X-rays obtained and reviewed in office: She had 2 views of the right knee including AP lateral was obtained and reviewed in office today. Shows percentage changes from a partial medial compartment replacement. The components are in good placement without any signs of loosening, fractures or dislocations    XR KNEE RIGHT (1-2 VIEWS)      Office Procedures:  Orders Placed This Encounter   Procedures    XR KNEE RIGHT (1-2 VIEWS)            Assessment: Patient is a 81-year-old female who underwent a medial compartment right knee replacement using Theodore technology on 8/27/18  Encounter Diagnoses   Name Primary?  Right knee pain, unspecified chronicity Yes    Status post right partial knee replacement        Treatment Plan: At this time we recommend she continue the physical therapy. We feel that she is progressing quite well in therapy. We recommended she continue take her aspirin until she is at least 4 weeks postop. All her questions were fully answered today. We will see her back in 3 weeks      Follow up in: 3 weeks p.r.n.      1:28 PM      Melo Martinez, [address] [place] [address] Physician Assistant    During this examination, Melo CASTANO PA-C, functioned as a scribe for Dr. Karan Sethi.      This dictation was performed with a verbal recognition program (DRAGON)

## 2018-09-14 ENCOUNTER — HOSPITAL ENCOUNTER (OUTPATIENT)
Dept: PHYSICAL THERAPY | Age: 58
Setting detail: THERAPIES SERIES
Discharge: HOME | End: 2018-09-14
Payer: COMMERCIAL

## 2018-09-14 PROCEDURE — 97530 THERAPEUTIC ACTIVITIES: CPT | Performed by: PHYSICAL THERAPIST

## 2018-09-14 PROCEDURE — 97110 THERAPEUTIC EXERCISES: CPT | Performed by: PHYSICAL THERAPIST

## 2018-09-14 PROCEDURE — 97112 NEUROMUSCULAR REEDUCATION: CPT | Performed by: PHYSICAL THERAPIST

## 2018-09-14 PROCEDURE — 97016 VASOPNEUMATIC DEVICE THERAPY: CPT | Performed by: PHYSICAL THERAPIST

## 2018-09-17 ENCOUNTER — HOSPITAL ENCOUNTER (OUTPATIENT)
Dept: PHYSICAL THERAPY | Age: 58
Setting detail: THERAPIES SERIES
End: 2018-09-17
Payer: COMMERCIAL

## 2018-09-17 ENCOUNTER — HOSPITAL ENCOUNTER (OUTPATIENT)
Dept: PHYSICAL THERAPY | Age: 58
Setting detail: THERAPIES SERIES
Discharge: HOME | End: 2018-09-17
Payer: COMMERCIAL

## 2018-09-17 PROCEDURE — 97530 THERAPEUTIC ACTIVITIES: CPT | Performed by: PHYSICAL THERAPY ASSISTANT

## 2018-09-17 PROCEDURE — G0283 ELEC STIM OTHER THAN WOUND: HCPCS | Performed by: PHYSICAL THERAPY ASSISTANT

## 2018-09-17 PROCEDURE — 97112 NEUROMUSCULAR REEDUCATION: CPT | Performed by: PHYSICAL THERAPY ASSISTANT

## 2018-09-17 PROCEDURE — 97110 THERAPEUTIC EXERCISES: CPT | Performed by: PHYSICAL THERAPY ASSISTANT

## 2018-09-17 NOTE — FLOWSHEET NOTE
Leg press      Leg extension     Leg curl          Manual interventions          Removal of PO bandage            Therapeutic Exercise and NMR EXR  [x] (22620) Provided verbal/tactile cueing for activities related to strengthening, flexibility, endurance, ROM for improvements in LE, proximal hip, and core control with self care, mobility, lifting, ambulation. [x] (31022) Provided verbal/tactile cueing for activities related to improving balance, coordination, kinesthetic sense, posture, motor skill, proprioception  to assist with LE, proximal hip, and core control in self care, mobility, lifting, ambulation and eccentric single leg control.      NMR and Therapeutic Activities:    [x] (39170 or 18320) Provided verbal/tactile cueing for activities related to improving balance, coordination, kinesthetic sense, posture, motor skill, proprioception and motor activation to allow for proper function of core, proximal hip and LE with self care and ADLs  [] (40426) Gait Re-education- Provided training and instruction to the patient for proper LE, core and proximal hip recruitment and positioning and eccentric body weight control with ambulation re-education including up and down stairs     Home Exercise Program:    [x] (46490) Reviewed/Progressed HEP activities related to strengthening, flexibility, endurance, ROM of core, proximal hip and LE for functional self-care, mobility, lifting and ambulation/stair navigation   [x] (75853)Reviewed/Progressed HEP activities related to improving balance, coordination, kinesthetic sense, posture, motor skill, proprioception of core, proximal hip and LE for self care, mobility, lifting, and ambulation/stair navigation      Manual Treatments:  PROM / STM / Oscillations-Mobs:  G-I, II, III, IV (PA's, Inf., Post.)  [x] (56196) Provided manual therapy to mobilize LE, proximal hip and/or LS spine soft tissue/joints for the purpose of modulating pain, promoting relaxation,  increasing ROM, reducing/eliminating soft tissue swelling/inflammation/restriction, improving soft tissue extensibility and allowing for proper ROM for normal function with self care, mobility, lifting and ambulation. STM and patella mobs  10'    Modalities Vaso 15'    Charges:  Timed Code Treatment Minutes: 60'   Total Treatment Minutes: 76'       [] EVAL (LOW) 94778 (typically 20 minutes face-to-face)  [] EVAL (MOD) 92329 (typically 30 minutes face-to-face)  [] EVAL (HIGH) 35951 (typically 45 minutes face-to-face)  [] RE-EVAL   [x] BY(56806) x  1   [] IONTO  [x] NMR (69840) x  1   [x] VASO  [x] Manual (49653) x  1    [] Other:  [x] TA x  1    [] Mech Traction (17905)  [] ES(attended) (01664)      [] ES (un) (00862):     GOALS:   Patient stated goal: Be able to walk long distances, carry loads and do stairs without pain.      Therapist goals for Patient:   Short Term Goals: To be achieved in: 2 weeks  1. Independent in HEP and progression per patient tolerance, in order to prevent re-injury. 2. Patient will have a decrease in pain to facilitate improvement in movement, function, and ADLs as indicated by Functional Deficits.     Long Term Goals: To be achieved in: 12 weeks  1. Disability index score of 30% or less for the LEFS to assist with reaching prior level of function. 2. Patient will demonstrate increased AROM to 0-135 deg to allow for proper joint functioning as indicated by patients Functional Deficits. 3. Patient will demonstrate an increase in Strength to good proximal hip strength and control in LE to allow for proper functional mobility as indicated by patients Functional Deficits. 4. Patient will return to household functional activities without increased symptoms or restriction.    5. Pt will be able to walk for 15 min without restriction in order to complete normal work duties. (patient specific functional goal)             Progression Towards Functional goals:  [] Patient is progressing as expected towards functional goals listed. [] Progression is slowed due to complexities listed. [] Progression has been slowed due to co-morbidities. [x] Plan just implemented, too soon to assess goals progression  [] Other:     ASSESSMENT:  Pt tolerated activities well this date. Continues to show improvement in ROM and strength. Slight flexed knee in stance persists but did show gain in end range knee extension today. Continues to be highly motivated to see improvement and is compliant with HEP. Would continue to benefit from skilled therapy in order to further increase ROM for functional activity, increase strength, and normalize gait.         Treatment/Activity Tolerance:  [x] Patient tolerated treatment well [] Patient limited by fatique  [x] Patient limited by pain  [] Patient limited by other medical complications  [x] Other: Moderate soreness/ pain with prolonged position of knee extended (mild extension tightness)    Patient education:  8/23/18: full pre-op instructions  8/28/18: full post-op instructions    Prognosis: [x] Good [] Fair  [] Poor    Patient Requires Follow-up: [x] Yes  [] No    PLAN: See eval  [x] Continue per plan of care [] Alter current plan (see comments)  [] Plan of care initiated [] Hold pending MD visit [] Discharge    Electronically signed by: Shea Ford PTA

## 2018-09-20 ENCOUNTER — HOSPITAL ENCOUNTER (OUTPATIENT)
Dept: PHYSICAL THERAPY | Age: 58
Setting detail: THERAPIES SERIES
Discharge: HOME | End: 2018-09-20
Payer: COMMERCIAL

## 2018-09-20 ENCOUNTER — APPOINTMENT (OUTPATIENT)
Dept: PHYSICAL THERAPY | Age: 58
End: 2018-09-20
Payer: COMMERCIAL

## 2018-09-20 PROCEDURE — 97112 NEUROMUSCULAR REEDUCATION: CPT | Performed by: PHYSICAL THERAPY ASSISTANT

## 2018-09-20 PROCEDURE — 97016 VASOPNEUMATIC DEVICE THERAPY: CPT | Performed by: PHYSICAL THERAPY ASSISTANT

## 2018-09-20 PROCEDURE — 97110 THERAPEUTIC EXERCISES: CPT | Performed by: PHYSICAL THERAPY ASSISTANT

## 2018-09-20 PROCEDURE — 97140 MANUAL THERAPY 1/> REGIONS: CPT | Performed by: PHYSICAL THERAPY ASSISTANT

## 2018-09-20 NOTE — FLOWSHEET NOTE
Leg press      Leg extension     Leg curl          Manual interventions     TM 5' Gait , 1.4mph   Removal of PO bandage            Therapeutic Exercise and NMR EXR  [x] (91845) Provided verbal/tactile cueing for activities related to strengthening, flexibility, endurance, ROM for improvements in LE, proximal hip, and core control with self care, mobility, lifting, ambulation. [x] (61287) Provided verbal/tactile cueing for activities related to improving balance, coordination, kinesthetic sense, posture, motor skill, proprioception  to assist with LE, proximal hip, and core control in self care, mobility, lifting, ambulation and eccentric single leg control.      NMR and Therapeutic Activities:    [x] (65112 or 76683) Provided verbal/tactile cueing for activities related to improving balance, coordination, kinesthetic sense, posture, motor skill, proprioception and motor activation to allow for proper function of core, proximal hip and LE with self care and ADLs  [] (99333) Gait Re-education- Provided training and instruction to the patient for proper LE, core and proximal hip recruitment and positioning and eccentric body weight control with ambulation re-education including up and down stairs     Home Exercise Program:    [x] (38943) Reviewed/Progressed HEP activities related to strengthening, flexibility, endurance, ROM of core, proximal hip and LE for functional self-care, mobility, lifting and ambulation/stair navigation   [x] (10568)Reviewed/Progressed HEP activities related to improving balance, coordination, kinesthetic sense, posture, motor skill, proprioception of core, proximal hip and LE for self care, mobility, lifting, and ambulation/stair navigation      Manual Treatments:  PROM / STM / Oscillations-Mobs:  G-I, II, III, IV (PA's, Inf., Post.)  [x] (37968) Provided manual therapy to mobilize LE, proximal hip and/or LS spine soft tissue/joints for the purpose of modulating pain, promoting relaxation,  increasing ROM, reducing/eliminating soft tissue swelling/inflammation/restriction, improving soft tissue extensibility and allowing for proper ROM for normal function with self care, mobility, lifting and ambulation. STM and patella mobs  10'    Modalities Vaso 15'    Charges:  Timed Code Treatment Minutes: 50'   Total Treatment Minutes: 72'       [] EVAL (LOW) 04101 (typically 20 minutes face-to-face)  [] EVAL (MOD) 02246 (typically 30 minutes face-to-face)  [] EVAL (HIGH) 56615 (typically 45 minutes face-to-face)  [] RE-EVAL   [x] JV(39790) x  1   [] IONTO  [x] NMR (49650) x  1   [x] VASO  [x] Manual (21834) x  1    [] Other:  [] TA x       [] Mech Traction (58556)  [] ES(attended) (73723)      [] ES (un) (75301):     GOALS:   Patient stated goal: Be able to walk long distances, carry loads and do stairs without pain.      Therapist goals for Patient:   Short Term Goals: To be achieved in: 2 weeks  1. Independent in HEP and progression per patient tolerance, in order to prevent re-injury. 2. Patient will have a decrease in pain to facilitate improvement in movement, function, and ADLs as indicated by Functional Deficits.     Long Term Goals: To be achieved in: 12 weeks  1. Disability index score of 30% or less for the LEFS to assist with reaching prior level of function. 2. Patient will demonstrate increased AROM to 0-135 deg to allow for proper joint functioning as indicated by patients Functional Deficits. 3. Patient will demonstrate an increase in Strength to good proximal hip strength and control in LE to allow for proper functional mobility as indicated by patients Functional Deficits. 4. Patient will return to household functional activities without increased symptoms or restriction.    5. Pt will be able to walk for 15 min without restriction in order to complete normal work duties. (patient specific functional goal)             Progression Towards Functional goals:  [] Patient is

## 2018-09-25 ENCOUNTER — HOSPITAL ENCOUNTER (OUTPATIENT)
Dept: PHYSICAL THERAPY | Age: 58
Setting detail: THERAPIES SERIES
Discharge: HOME | End: 2018-09-25
Payer: COMMERCIAL

## 2018-09-25 PROCEDURE — G8979 MOBILITY GOAL STATUS: HCPCS | Performed by: PHYSICAL THERAPIST

## 2018-09-25 PROCEDURE — G8978 MOBILITY CURRENT STATUS: HCPCS | Performed by: PHYSICAL THERAPIST

## 2018-09-25 PROCEDURE — 97110 THERAPEUTIC EXERCISES: CPT | Performed by: PHYSICAL THERAPIST

## 2018-09-25 PROCEDURE — 97112 NEUROMUSCULAR REEDUCATION: CPT | Performed by: PHYSICAL THERAPIST

## 2018-09-25 PROCEDURE — 97016 VASOPNEUMATIC DEVICE THERAPY: CPT | Performed by: PHYSICAL THERAPIST

## 2018-09-25 NOTE — PLAN OF CARE
(ICD-10-CM) - Primary osteoarthritis of right knee  · Treatment Diagnosis: Pt presents with s/s consistent with MD diagnosis. Currently has mildly decreased ROM, weakness of entire LE, altered gait and decreased functional mobility. · S/P R Medial PKA LUIS ALFREDO 8/27/18  · Meds: Percocet, Baby Aspirin BID; Advil vs Aleve PRN (GI precautions)   Insurance/Certification information:  PT Insurance Information: PT BENEFITS 2018/ [employer]/ ACTIVE/ DED 6400 MET 2600.11/ OOP 12,800/ PAYS 100%/ 40 [facility] COMB/ O AUTH REQ/ REF# [id number]/ 8-21-18 CB/   Physician Information:  Referring Practitioner: Dorian Donald  Plan of care signed (Y/N): Yes    Date of Patient follow up with Physician: 6 and 12 weeks PO  Patient seen in consultation with Dr. Renny Abad who established the initial/subsequent treatment protocol. 8-28-18: reviewed surgery and shown pics, surgery went well, very pleased with fit  9-11-18: seen in MD office, x-rays taken    G-Code (if applicable):      Date G-Code Applied: 9/25/18   PT G-Codes  Functional Assessment Tool Used: LEFS  Score: 61% deficit  Functional Limitation: Mobility: Walking and moving around  Mobility: Walking and Moving Around Current Status (): At least 60 percent but less than 80 percent impaired, limited or restricted  Mobility: Walking and Moving Around Goal Status (): At least 20 percent but less than 40 percent impaired, limited or restricted    Progress Note: [x]  Yes  []  No  Next due by: Visit #20       Latex Allergy:  [x]NO      []YES  Preferred Language for Healthcare:   [x]English       []other:    Visit # Insurance Allowable   10 40 VPCY comb     Pain level:  0/10     SUBJECTIVE: Pt doing \"good and bad. \" Feels her walking has improved and she no longer has to think about her technique. Continues to have stiffness with prolonged positioning, including while sleeping at night. Any time she repositions after falling asleep for a few hours she wakes up d/t discomfort. 0lbs   Weight Shift     ERMI 5 x 60'    Bike 10min rec        CKC     Calf raises 3x10 standing   Wall sits 3xfat    Step ups 3x5 ea 6\" up/back  4\" up/over   1 leg stand     Squatting     CC TKE x30 Nelson   Balance 4' PS L9   Tandem stance 4 x 30\"              PRE     Extension 3x10 RANGE: LAQ, 2#   Flexion  RANGE:        Quantum machines     Leg press      Leg extension     Leg curl          Manual interventions     TM 5' Gait , 1.4mph   Removal of PO bandage            Therapeutic Exercise and NMR EXR  [x] (10008) Provided verbal/tactile cueing for activities related to strengthening, flexibility, endurance, ROM for improvements in LE, proximal hip, and core control with self care, mobility, lifting, ambulation. [x] (92140) Provided verbal/tactile cueing for activities related to improving balance, coordination, kinesthetic sense, posture, motor skill, proprioception  to assist with LE, proximal hip, and core control in self care, mobility, lifting, ambulation and eccentric single leg control.      NMR and Therapeutic Activities:    [x] (94940 or 85574) Provided verbal/tactile cueing for activities related to improving balance, coordination, kinesthetic sense, posture, motor skill, proprioception and motor activation to allow for proper function of core, proximal hip and LE with self care and ADLs  [] (57121) Gait Re-education- Provided training and instruction to the patient for proper LE, core and proximal hip recruitment and positioning and eccentric body weight control with ambulation re-education including up and down stairs     Home Exercise Program:    [x] (80229) Reviewed/Progressed HEP activities related to strengthening, flexibility, endurance, ROM of core, proximal hip and LE for functional self-care, mobility, lifting and ambulation/stair navigation   [x] (03270)Reviewed/Progressed HEP activities related to improving balance, coordination, kinesthetic sense, posture, motor skill, proprioception of core, proximal hip and LE for self care, mobility, lifting, and ambulation/stair navigation      Manual Treatments:  PROM / STM / Oscillations-Mobs:  G-I, II, III, IV (PA's, Inf., Post.)  [] (88777) Provided manual therapy to mobilize LE, proximal hip and/or LS spine soft tissue/joints for the purpose of modulating pain, promoting relaxation,  increasing ROM, reducing/eliminating soft tissue swelling/inflammation/restriction, improving soft tissue extensibility and allowing for proper ROM for normal function with self care, mobility, lifting and ambulation. Modalities Vaso 15'    Charges:  Timed Code Treatment Minutes: 50'   Total Treatment Minutes: 72'       [] EVAL (LOW) 455 1011 (typically 20 minutes face-to-face)  [] EVAL (MOD) 79229 (typically 30 minutes face-to-face)  [] EVAL (HIGH) 15153 (typically 45 minutes face-to-face)  [] RE-EVAL   [x] QB(83340) x  2   [] IONTO  [x] NMR (50016) x  1   [x] VASO  [] Manual (47996) x       [] Other:  [] TA x       [] Mech Traction (57728)  [] ES(attended) (64987)      [] ES (un) (34980):     GOALS:   Patient stated goal: Be able to walk long distances, carry loads and do stairs without pain.      Therapist goals for Patient:   Short Term Goals: To be achieved in: 2 weeks  1. Independent in HEP and progression per patient tolerance, in order to prevent re-injury. MET  2. Patient will have a decrease in pain to facilitate improvement in movement, function, and ADLs as indicated by Functional Deficits. MET     Long Term Goals: To be achieved in: 12 weeks  1. Disability index score of 30% or less for the LEFS to assist with reaching prior level of function. NOT MET   2. Patient will demonstrate increased AROM to 0-135 deg to allow for proper joint functioning as indicated by patients Functional Deficits. NOT MET   3.  Patient will demonstrate an increase in Strength to good proximal hip strength and control in LE to allow for proper functional mobility as indicated by

## 2018-09-28 ENCOUNTER — HOSPITAL ENCOUNTER (OUTPATIENT)
Dept: PHYSICAL THERAPY | Age: 58
Setting detail: THERAPIES SERIES
Discharge: HOME | End: 2018-09-28
Payer: COMMERCIAL

## 2018-09-28 PROCEDURE — 97016 VASOPNEUMATIC DEVICE THERAPY: CPT | Performed by: PHYSICAL THERAPIST

## 2018-09-28 PROCEDURE — 97110 THERAPEUTIC EXERCISES: CPT | Performed by: PHYSICAL THERAPIST

## 2018-09-28 PROCEDURE — 97112 NEUROMUSCULAR REEDUCATION: CPT | Performed by: PHYSICAL THERAPIST

## 2018-09-28 PROCEDURE — 97140 MANUAL THERAPY 1/> REGIONS: CPT | Performed by: PHYSICAL THERAPIST

## 2018-09-28 NOTE — FLOWSHEET NOTE
Comment     L R L R L R     Flexion 143 125          ERMI   Extension +3 -2 +5 -1                                                9/25/18  Strength L R Comment   Quad 4+ 3+ JOSH 0°   Hamstring 4+ 4-     Gastroc       Hip  flexion 4+ 4+     Hip abd 4 4-                            9/5/18  Special Test Results/Comment   Homans Neg   Temp Neg      8/23/18  Girth L R   Mid Patella 39.2 40.4   Suprapatellar 39.8 41.6   5cm above 43.7 44.7   15cm above          TEST INITIAL 8/23/18   GOAL   SINGLE LEG STANCE TIME L: 30\"+     R: 30\"+   >25 SECONDS         Reflexes/Sensation:               [x]Dermatomes/Myotomes intact               [x]Reflexes equal and normal bilaterally              []Other:     Joint mobility:                [x]Normal               []Hypo              []Hyper     Palpation: generalized TTP of entire knee 8/28/18     Functional Mobility/Transfers: Independent with floor to table transfers     Posture: good     Bandages/Dressings/Incisions: healing well; (-) redness     Gait:  Stiff-legged, slight flexed knee posture, antalgic, U/L crutch 9/17/18 normalized, no AD 9/25/18     RESTRICTIONS/PRECAUTIONS: none    Exercises/Interventions:   Exercise/Equipment Resistance/Repetitions Other comments   Stretching     Hamstring 5 x 30\" Prop   Inclined Calf 5x30''    Hip Flexion     ITB 5 x 30\"    Groin     Quad 5 x 30\"         Bike 10' Res 3        SLR     Supine 3x 10 2#   Abduction 3 x 10 2#   Adduction HEP   Prone    SLR+        Isometrics    Quad sets HEP        Patellar Glides     Medial 2'    Superior 2'    Inferior 2'         ROM     Sheet Pulls     Hang Weights 10' 0lbs   Weight Shift     ERMI 5 x 60'    Bike          CKC     Calf raises 3x10 8# Maira Mendoza sits 1 x fatigue    Step ups 3x5 ea 6\" up/back  6\" up/over   1 leg stand     Squatting 3 x 10 To chair height; mirror feedback   CC TKE Balance 3x LOS med L9   Tandem stance 4 x 30\" Airex             PRE     Extension Flexion  RANGE:        Quantum machines Leg press      Leg extension     Leg curl          Manual interventions     TM 5' Gait , 1.4mph   Removal of PO bandage            Therapeutic Exercise and NMR EXR  [x] (53880) Provided verbal/tactile cueing for activities related to strengthening, flexibility, endurance, ROM for improvements in LE, proximal hip, and core control with self care, mobility, lifting, ambulation. [x] (40896) Provided verbal/tactile cueing for activities related to improving balance, coordination, kinesthetic sense, posture, motor skill, proprioception  to assist with LE, proximal hip, and core control in self care, mobility, lifting, ambulation and eccentric single leg control.      NMR and Therapeutic Activities:    [x] (73010 or 44138) Provided verbal/tactile cueing for activities related to improving balance, coordination, kinesthetic sense, posture, motor skill, proprioception and motor activation to allow for proper function of core, proximal hip and LE with self care and ADLs  [] (62703) Gait Re-education- Provided training and instruction to the patient for proper LE, core and proximal hip recruitment and positioning and eccentric body weight control with ambulation re-education including up and down stairs     Home Exercise Program:    [x] (06396) Reviewed/Progressed HEP activities related to strengthening, flexibility, endurance, ROM of core, proximal hip and LE for functional self-care, mobility, lifting and ambulation/stair navigation   [x] (25432)Reviewed/Progressed HEP activities related to improving balance, coordination, kinesthetic sense, posture, motor skill, proprioception of core, proximal hip and LE for self care, mobility, lifting, and ambulation/stair navigation      Manual Treatments:  PROM / STM / Oscillations-Mobs:  G-I, II, III, IV (PA's, Inf., Post.)  [x] (51564) Provided manual therapy to mobilize LE, proximal hip and/or LS spine soft tissue/joints for the purpose of modulating pain, promoting relaxation,  increasing ROM, reducing/eliminating soft tissue swelling/inflammation/restriction, improving soft tissue extensibility and allowing for proper ROM for normal function with self care, mobility, lifting and ambulation. STM and patella mobs  10'    Modalities Vaso 15'    Charges:  Timed Code Treatment Minutes: 50'   Total Treatment Minutes: 72'       [] EVAL (LOW) 87510 (typically 20 minutes face-to-face)  [] EVAL (MOD) 82580 (typically 30 minutes face-to-face)  [] EVAL (HIGH) 66240 (typically 45 minutes face-to-face)  [] RE-EVAL   [x] XE(14279) x  1   [] IONTO  [x] NMR (55881) x  1   [x] VASO  [x] Manual (35693) x  1    [] Other:  [] TA x       [] Mech Traction (42409)  [] ES(attended) (70005)      [] ES (un) (46064):     GOALS:   Patient stated goal: Be able to walk long distances, carry loads and do stairs without pain.      Therapist goals for Patient:   Short Term Goals: To be achieved in: 2 weeks  1. Independent in HEP and progression per patient tolerance, in order to prevent re-injury. MET  2. Patient will have a decrease in pain to facilitate improvement in movement, function, and ADLs as indicated by Functional Deficits. MET     Long Term Goals: To be achieved in: 12 weeks  1. Disability index score of 30% or less for the LEFS to assist with reaching prior level of function. NOT MET   2. Patient will demonstrate increased AROM to 0-135 deg to allow for proper joint functioning as indicated by patients Functional Deficits. NOT MET   3. Patient will demonstrate an increase in Strength to good proximal hip strength and control in LE to allow for proper functional mobility as indicated by patients Functional Deficits. NOT MET  4. Patient will return to household functional activities without increased symptoms or restriction. NO TMET  5.  Pt will be able to walk for 15 min without restriction in order to complete normal work duties. (patient specific functional goal)   NOT MET    Progression Towards Functional goals:  [x] Patient is progressing as expected towards functional goals listed. [] Progression is slowed due to complexities listed. [] Progression has been slowed due to co-morbidities. [] Plan just implemented, too soon to assess goals progression  [] Other:     ASSESSMENT: Pt progressing steadily. Normalized gait. Improved eccentric quad control during step down at increased height. Continuing to make progress with ROM into flexion and ext. Pt has been consistent with HEP to date and responding well to treatment.       Treatment/Activity Tolerance:  [x] Patient tolerated treatment well [] Patient limited by fatique  [x] Patient limited by pain  [] Patient limited by other medical complications  [] Other:     Patient education:  8/23/18: full pre-op instructions  8/28/18: full post-op instructions    Prognosis: [x] Good [] Fair  [] Poor    Patient Requires Follow-up: [x] Yes  [] No    PLAN: See eval  [x] Continue per plan of care [] Alter current plan (see comments)  [] Plan of care initiated [] Hold pending MD visit [] Discharge    Electronically signed by: Abraham Lang PT

## 2018-10-01 ENCOUNTER — HOSPITAL ENCOUNTER (OUTPATIENT)
Dept: PHYSICAL THERAPY | Age: 58
Setting detail: THERAPIES SERIES
Discharge: HOME | End: 2018-10-01
Payer: COMMERCIAL

## 2018-10-01 PROCEDURE — 97016 VASOPNEUMATIC DEVICE THERAPY: CPT | Performed by: PHYSICAL THERAPIST

## 2018-10-01 PROCEDURE — 97530 THERAPEUTIC ACTIVITIES: CPT | Performed by: PHYSICAL THERAPIST

## 2018-10-01 PROCEDURE — 97110 THERAPEUTIC EXERCISES: CPT | Performed by: PHYSICAL THERAPIST

## 2018-10-01 PROCEDURE — 97112 NEUROMUSCULAR REEDUCATION: CPT | Performed by: PHYSICAL THERAPIST

## 2018-10-05 ENCOUNTER — HOSPITAL ENCOUNTER (OUTPATIENT)
Dept: PHYSICAL THERAPY | Age: 58
Setting detail: THERAPIES SERIES
Discharge: HOME | End: 2018-10-05
Payer: COMMERCIAL

## 2018-10-05 PROCEDURE — 97140 MANUAL THERAPY 1/> REGIONS: CPT | Performed by: PHYSICAL THERAPIST

## 2018-10-05 PROCEDURE — 97530 THERAPEUTIC ACTIVITIES: CPT | Performed by: PHYSICAL THERAPIST

## 2018-10-05 PROCEDURE — 97110 THERAPEUTIC EXERCISES: CPT | Performed by: PHYSICAL THERAPIST

## 2018-10-05 PROCEDURE — 97112 NEUROMUSCULAR REEDUCATION: CPT | Performed by: PHYSICAL THERAPIST

## 2018-10-05 NOTE — FLOWSHEET NOTE
RANGE: 0-90, 25# DL        Manual interventions     TM Removal of PO bandage            Therapeutic Exercise and NMR EXR  [x] (38823) Provided verbal/tactile cueing for activities related to strengthening, flexibility, endurance, ROM for improvements in LE, proximal hip, and core control with self care, mobility, lifting, ambulation. [x] (69249) Provided verbal/tactile cueing for activities related to improving balance, coordination, kinesthetic sense, posture, motor skill, proprioception  to assist with LE, proximal hip, and core control in self care, mobility, lifting, ambulation and eccentric single leg control.      NMR and Therapeutic Activities:    [x] (69083 or 77698) Provided verbal/tactile cueing for activities related to improving balance, coordination, kinesthetic sense, posture, motor skill, proprioception and motor activation to allow for proper function of core, proximal hip and LE with self care and ADLs  [] (41857) Gait Re-education- Provided training and instruction to the patient for proper LE, core and proximal hip recruitment and positioning and eccentric body weight control with ambulation re-education including up and down stairs     Home Exercise Program:    [x] (80924) Reviewed/Progressed HEP activities related to strengthening, flexibility, endurance, ROM of core, proximal hip and LE for functional self-care, mobility, lifting and ambulation/stair navigation   [x] (10057)Reviewed/Progressed HEP activities related to improving balance, coordination, kinesthetic sense, posture, motor skill, proprioception of core, proximal hip and LE for self care, mobility, lifting, and ambulation/stair navigation      Manual Treatments:  PROM / STM / Oscillations-Mobs:  G-I, II, III, IV (PA's, Inf., Post.)  [x] (08067) Provided manual therapy to mobilize LE, proximal hip and/or LS spine soft tissue/joints for the purpose of modulating pain, promoting relaxation,  increasing ROM, reducing/eliminating soft

## 2018-10-09 ENCOUNTER — HOSPITAL ENCOUNTER (OUTPATIENT)
Dept: PHYSICAL THERAPY | Age: 58
Setting detail: THERAPIES SERIES
Discharge: HOME | End: 2018-10-09
Payer: COMMERCIAL

## 2018-10-09 DIAGNOSIS — M17.11 PRIMARY LOCALIZED OSTEOARTHROSIS OF RIGHT LOWER LEG: Primary | ICD-10-CM

## 2018-10-09 PROCEDURE — 97112 NEUROMUSCULAR REEDUCATION: CPT | Performed by: PHYSICAL THERAPIST

## 2018-10-09 PROCEDURE — 97110 THERAPEUTIC EXERCISES: CPT | Performed by: PHYSICAL THERAPIST

## 2018-10-09 PROCEDURE — 97016 VASOPNEUMATIC DEVICE THERAPY: CPT | Performed by: PHYSICAL THERAPIST

## 2018-10-09 PROCEDURE — 97530 THERAPEUTIC ACTIVITIES: CPT | Performed by: PHYSICAL THERAPIST

## 2018-10-09 RX ORDER — GABAPENTIN 300 MG/1
CAPSULE ORAL
Qty: 30 CAPSULE | Refills: 1 | Status: SHIPPED | OUTPATIENT
Start: 2018-10-09 | End: 2019-08-27 | Stop reason: CLARIF

## 2018-10-09 NOTE — FLOWSHEET NOTE
demonstrate an increase in Strength to good proximal hip strength and control in LE to allow for proper functional mobility as indicated by patients Functional Deficits. NOT MET  4. Patient will return to household functional activities without increased symptoms or restriction. NO TMET  5. Pt will be able to walk for 15 min without restriction in order to complete normal work duties. (patient specific functional goal)   NOT MET          Progression Towards Functional goals:  [x] Patient is progressing as expected towards functional goals listed. [] Progression is slowed due to complexities listed. [] Progression has been slowed due to co-morbidities. [] Plan just implemented, too soon to assess goals progression  [] Other:     ASSESSMENT: Pt continuing to progress well. Does not appear to have tolerated increased activity, especially weight bearing activity. Pt does have improved flexion ROM and quad control during step down. Knee ext weight machines continues to be held d/t anterolateral pain near lateral fat pad. Pt has continued to be compliant with HEP to date. Will continue to monitor symptoms.      Treatment/Activity Tolerance:  [x] Patient tolerated treatment well [] Patient limited by fatique  [x] Patient limited by pain  [] Patient limited by other medical complications  [] Other:     Patient education:  8/23/18: full pre-op instructions  8/28/18: full post-op instructions    Prognosis: [x] Good [] Fair  [] Poor    Patient Requires Follow-up: [x] Yes  [] No    PLAN: See felisha  [x] Continue per plan of care [] Alter current plan (see comments)  [] Plan of care initiated [] Hold pending MD visit [] Discharge    Electronically signed by: Florencio Tellez PT

## 2018-10-12 ENCOUNTER — HOSPITAL ENCOUNTER (OUTPATIENT)
Dept: PHYSICAL THERAPY | Age: 58
Setting detail: THERAPIES SERIES
Discharge: HOME | End: 2018-10-12
Payer: COMMERCIAL

## 2018-10-12 PROCEDURE — 97110 THERAPEUTIC EXERCISES: CPT | Performed by: PHYSICAL THERAPIST

## 2018-10-12 PROCEDURE — 97112 NEUROMUSCULAR REEDUCATION: CPT | Performed by: PHYSICAL THERAPIST

## 2018-10-12 PROCEDURE — 97530 THERAPEUTIC ACTIVITIES: CPT | Performed by: PHYSICAL THERAPIST

## 2018-10-12 NOTE — FLOWSHEET NOTE
to household functional activities without increased symptoms or restriction. NO TMET  5. Pt will be able to walk for 15 min without restriction in order to complete normal work duties. (patient specific functional goal)   NOT MET          Progression Towards Functional goals:  [x] Patient is progressing as expected towards functional goals listed. [] Progression is slowed due to complexities listed. [] Progression has been slowed due to co-morbidities. [] Plan just implemented, too soon to assess goals progression  [] Other:     ASSESSMENT: Pt continuing to progress well. Held hamstring strengthening exercises today d/t posteromedial pain on distal hamstring tendon. Pt had improved eccentric quad control during step down exercise. Tolerated addition of monster walks well. Was challenged by addition of unstable support on SL balance. Pt has been consistent with HEP to date. Reviewed gym program with pt today with good understanding.     Treatment/Activity Tolerance:  [x] Patient tolerated treatment well [] Patient limited by fatique  [x] Patient limited by pain  [] Patient limited by other medical complications  [] Other:     Patient education:  8/23/18: full pre-op instructions  8/28/18: full post-op instructions  10/12/18: updated HEP - gym    Prognosis: [x] Good [] Fair  [] Poor    Patient Requires Follow-up: [x] Yes  [] No    PLAN: See eval  [x] Continue per plan of care [] Alter current plan (see comments)  [] Plan of care initiated [] Hold pending MD visit [] Discharge    Electronically signed by: Annalise Ayala PT

## 2018-10-15 ENCOUNTER — HOSPITAL ENCOUNTER (OUTPATIENT)
Dept: PHYSICAL THERAPY | Age: 58
Setting detail: THERAPIES SERIES
Discharge: HOME | End: 2018-10-15
Payer: COMMERCIAL

## 2018-10-15 ENCOUNTER — TELEPHONE (OUTPATIENT)
Dept: FAMILY MEDICINE CLINIC | Age: 58
End: 2018-10-15

## 2018-10-15 PROCEDURE — 97016 VASOPNEUMATIC DEVICE THERAPY: CPT | Performed by: PHYSICAL THERAPIST

## 2018-10-15 PROCEDURE — 97112 NEUROMUSCULAR REEDUCATION: CPT | Performed by: PHYSICAL THERAPIST

## 2018-10-15 PROCEDURE — 97110 THERAPEUTIC EXERCISES: CPT | Performed by: PHYSICAL THERAPIST

## 2018-10-15 PROCEDURE — 97530 THERAPEUTIC ACTIVITIES: CPT | Performed by: PHYSICAL THERAPIST

## 2018-10-15 NOTE — TELEPHONE ENCOUNTER
----- Message from Luz Maria Noel MD sent at 10/12/2018 12:37 PM EDT -----  Call pt- HEP C negative.   ES

## 2018-10-15 NOTE — FLOWSHEET NOTE
determine what the cause is. Otherwise knee is looking and feeling good.      (-) giving way  (+) night pain/ stiffness; knee is warm; can't get comfortable  (-) popping/ 1x on L hip  (-)catching/locking  (+) swelling  (+) stiffness/ prolonged position  (+) stairs/   (NO) kneeling/squatting      OBJECTIVE:   10/15/18            ROM PROM AROM Overpressure Comment     L R L R L R     Flexion 143 133          ERMI   Extension +3 -1 +5 0                                                9/25/18  Strength L R Comment   Quad 4+ 3+ JOSH 0°   Hamstring 4+ 4-     Gastroc       Hip  flexion 4+ 4+     Hip abd 4 4-                            9/5/18  Special Test Results/Comment   Homans Neg   Temp Neg      8/23/18  Girth L R   Mid Patella 39.2 40.4   Suprapatellar 39.8 41.6   5cm above 43.7 44.7   15cm above          TEST INITIAL 8/23/18   GOAL   SINGLE LEG STANCE TIME L: 30\"+     R: 30\"+   >25 SECONDS         Reflexes/Sensation:               [x]Dermatomes/Myotomes intact               [x]Reflexes equal and normal bilaterally              []Other:     Joint mobility:                [x]Normal               []Hypo              []Hyper     Palpation: generalized TTP of entire knee 8/28/18     Functional Mobility/Transfers: Independent with floor to table transfers     Posture: good     Bandages/Dressings/Incisions: healing well; (-) redness     Gait:  Stiff-legged, slight flexed knee posture, antalgic, U/L crutch 9/17/18 normalized, no AD 9/25/18     RESTRICTIONS/PRECAUTIONS: none    Exercises/Interventions:   Exercise/Equipment Resistance/Repetitions Other comments   Stretching     Hamstring 5 x 30\" 3-positions; prop   Inclined Calf 5x30''    Hip Flexion     ITB 5 x 30\"    Groin     Quad 5 x 30\"         Bike 10' Res 4; Recumbent        SLR     Supine Abduction Adduction HEP   Prone    SLR+        Isometrics    Quad sets HEP        Patellar Glides     Medial    Superior    Inferior         ROM     Sheet Pulls     Hang Weights Weight posture, motor skill, proprioception of core, proximal hip and LE for self care, mobility, lifting, and ambulation/stair navigation      Manual Treatments:  PROM / STM / Oscillations-Mobs:  G-I, II, III, IV (PA's, Inf., Post.)  [] (14664) Provided manual therapy to mobilize LE, proximal hip and/or LS spine soft tissue/joints for the purpose of modulating pain, promoting relaxation,  increasing ROM, reducing/eliminating soft tissue swelling/inflammation/restriction, improving soft tissue extensibility and allowing for proper ROM for normal function with self care, mobility, lifting and ambulation. Modalities Vaso 15'     Charges:  Timed Code Treatment Minutes: 60'   Total Treatment Minutes: [de-identified]'       [] EVAL (LOW) 455 1011 (typically 20 minutes face-to-face)  [] EVAL (MOD) 07793 (typically 30 minutes face-to-face)  [] EVAL (HIGH) 99613 (typically 45 minutes face-to-face)  [] RE-EVAL   [x] HT(04420) x  2   [] IONTO  [x] NMR (91071) x  1   [] VASO  [] Manual (01962) x       [] Other:  [x] TA x  1    [] Mech Traction (24208)  [] ES(attended) (96320)      [] ES (un) (39710):     GOALS:   Patient stated goal: Be able to walk long distances, carry loads and do stairs without pain.      Therapist goals for Patient:   Short Term Goals: To be achieved in: 2 weeks  1. Independent in HEP and progression per patient tolerance, in order to prevent re-injury. MET  2. Patient will have a decrease in pain to facilitate improvement in movement, function, and ADLs as indicated by Functional Deficits. MET     Long Term Goals: To be achieved in: 12 weeks  1. Disability index score of 30% or less for the LEFS to assist with reaching prior level of function. NOT MET   2. Patient will demonstrate increased AROM to 0-135 deg to allow for proper joint functioning as indicated by patients Functional Deficits. NOT MET   3.  Patient will demonstrate an increase in Strength to good proximal hip strength and control in LE to allow for proper

## 2018-10-19 ENCOUNTER — HOSPITAL ENCOUNTER (OUTPATIENT)
Dept: PHYSICAL THERAPY | Age: 58
Setting detail: THERAPIES SERIES
Discharge: HOME | End: 2018-10-19
Payer: COMMERCIAL

## 2018-10-19 PROCEDURE — 97110 THERAPEUTIC EXERCISES: CPT | Performed by: PHYSICAL THERAPIST

## 2018-10-19 PROCEDURE — 97112 NEUROMUSCULAR REEDUCATION: CPT | Performed by: PHYSICAL THERAPIST

## 2018-10-19 NOTE — FLOWSHEET NOTE
12# Celester Hans sits 1 x fatigue WS to R   Step ups 3x10 6\" up/over   1 leg stand 3 x 30\" Airex   Squatting CC TKE Balance 3x LOS hard L9   Tandem stance Side stepping 3 laps Blue TB   Monster walks 3 laps Blue TB        PRE     Extension Flexion  RANGE:        Quantum machines     Leg press  3 x 10 RANGE: 80-10, 80# DL   Leg extension 3 x 10 RANGE: 90-30, 25# DL   Leg curl   HOLD     Manual interventions     TM Removal of PO bandage            Therapeutic Exercise and NMR EXR  [x] (50235) Provided verbal/tactile cueing for activities related to strengthening, flexibility, endurance, ROM for improvements in LE, proximal hip, and core control with self care, mobility, lifting, ambulation. [x] (69239) Provided verbal/tactile cueing for activities related to improving balance, coordination, kinesthetic sense, posture, motor skill, proprioception  to assist with LE, proximal hip, and core control in self care, mobility, lifting, ambulation and eccentric single leg control.      NMR and Therapeutic Activities:    [x] (44955 or 14573) Provided verbal/tactile cueing for activities related to improving balance, coordination, kinesthetic sense, posture, motor skill, proprioception and motor activation to allow for proper function of core, proximal hip and LE with self care and ADLs  [] (24755) Gait Re-education- Provided training and instruction to the patient for proper LE, core and proximal hip recruitment and positioning and eccentric body weight control with ambulation re-education including up and down stairs     Home Exercise Program:    [x] (74571) Reviewed/Progressed HEP activities related to strengthening, flexibility, endurance, ROM of core, proximal hip and LE for functional self-care, mobility, lifting and ambulation/stair navigation   [x] (92839)Reviewed/Progressed HEP activities related to improving balance, coordination, kinesthetic sense, posture, motor skill, proprioception of core, proximal hip and LE for

## 2018-10-22 ENCOUNTER — HOSPITAL ENCOUNTER (OUTPATIENT)
Dept: PHYSICAL THERAPY | Age: 58
Setting detail: THERAPIES SERIES
Discharge: HOME | End: 2018-10-22
Payer: COMMERCIAL

## 2018-10-22 PROCEDURE — 97112 NEUROMUSCULAR REEDUCATION: CPT | Performed by: PHYSICAL THERAPIST

## 2018-10-22 PROCEDURE — 97110 THERAPEUTIC EXERCISES: CPT | Performed by: PHYSICAL THERAPIST

## 2018-10-22 PROCEDURE — 97530 THERAPEUTIC ACTIVITIES: CPT | Performed by: PHYSICAL THERAPIST

## 2018-10-22 NOTE — FLOWSHEET NOTE
way  (+) night pain/ stiffness; knee is warm; can't get comfortable  (-) popping/ 1x on L hip  (-)catching/locking  (+) swelling  (+) stiffness/ prolonged position  (+) stairs/   (NO) kneeling/squatting      OBJECTIVE:   10/22/18            ROM PROM AROM Overpressure Comment     L R L R L R     Flexion 143 133          ERMI   Extension +3 -1 +5 0                                                9/25/18  Strength L R Comment   Quad 4+ 3+ JOSH 0°   Hamstring 4+ 4-     Gastroc       Hip  flexion 4+ 4+     Hip abd 4 4-                            9/5/18  Special Test Results/Comment   Homans Neg   Temp Neg      8/23/18  Girth L R   Mid Patella 39.2 40.4   Suprapatellar 39.8 41.6   5cm above 43.7 44.7   15cm above          TEST INITIAL 8/23/18   GOAL   SINGLE LEG STANCE TIME L: 30\"+     R: 30\"+   >25 SECONDS         Reflexes/Sensation:               [x]Dermatomes/Myotomes intact               [x]Reflexes equal and normal bilaterally              []Other:     Joint mobility:                [x]Normal               []Hypo              []Hyper     Palpation: generalized TTP of entire knee 8/28/18     Functional Mobility/Transfers: Independent with floor to table transfers     Posture: good     Bandages/Dressings/Incisions: healing well; (-) redness     Gait:  Stiff-legged, slight flexed knee posture, antalgic, U/L crutch 9/17/18 normalized, no AD 9/25/18     RESTRICTIONS/PRECAUTIONS: none    Exercises/Interventions:   Exercise/Equipment Resistance/Repetitions Other comments   Stretching     Hamstring 5 x 30\" 3-positions; prop   Inclined Calf 5x30''    Hip Flexion     ITB 5 x 30\"    Groin     Quad 5 x 30\"         Bike 10' Res 4; Recumbent        SLR     Supine Abduction Adduction HEP   Prone    SLR+        Isometrics    Quad sets HEP        Patellar Glides     Medial    Superior    Inferior         ROM     Sheet Pulls     Hang Weights Weight Shift     ERMI 5 x 30\"    Bike          CKC     Calf raises 3x10 15# DBs   Wall sits 1 x self care, mobility, lifting, and ambulation/stair navigation      Manual Treatments:  PROM / STM / Oscillations-Mobs:  G-I, II, III, IV (PA's, Inf., Post.)  [] (65349) Provided manual therapy to mobilize LE, proximal hip and/or LS spine soft tissue/joints for the purpose of modulating pain, promoting relaxation,  increasing ROM, reducing/eliminating soft tissue swelling/inflammation/restriction, improving soft tissue extensibility and allowing for proper ROM for normal function with self care, mobility, lifting and ambulation. Modalities Ice 15'    Charges:  Timed Code Treatment Minutes: 54'   Total Treatment Minutes: [de-identified]'       [] EVAL (LOW) 90696 (typically 20 minutes face-to-face)  [] EVAL (MOD) 64601 (typically 30 minutes face-to-face)  [] EVAL (HIGH) 88999 (typically 45 minutes face-to-face)  [] RE-EVAL   [x] CO(08570) x  2   [] IONTO  [x] NMR (95282) x  1   [] VASO  [] Manual (88609) x       [] Other:  [x] TA x  1    [] Mech Traction (93880)  [] ES(attended) (99998)      [] ES (un) (88610):     GOALS:   Patient stated goal: Be able to walk long distances, carry loads and do stairs without pain.      Therapist goals for Patient:   Short Term Goals: To be achieved in: 2 weeks  1. Independent in HEP and progression per patient tolerance, in order to prevent re-injury. MET  2. Patient will have a decrease in pain to facilitate improvement in movement, function, and ADLs as indicated by Functional Deficits. MET     Long Term Goals: To be achieved in: 12 weeks  1. Disability index score of 30% or less for the LEFS to assist with reaching prior level of function. NOT MET   2. Patient will demonstrate increased AROM to 0-135 deg to allow for proper joint functioning as indicated by patients Functional Deficits. NOT MET   3. Patient will demonstrate an increase in Strength to good proximal hip strength and control in LE to allow for proper functional mobility as indicated by patients Functional Deficits.   NOT

## 2018-10-26 ENCOUNTER — HOSPITAL ENCOUNTER (OUTPATIENT)
Dept: PHYSICAL THERAPY | Age: 58
Setting detail: THERAPIES SERIES
Discharge: HOME | End: 2018-10-26
Payer: COMMERCIAL

## 2018-10-26 PROCEDURE — 97110 THERAPEUTIC EXERCISES: CPT | Performed by: PHYSICAL THERAPIST

## 2018-10-26 PROCEDURE — 97530 THERAPEUTIC ACTIVITIES: CPT | Performed by: PHYSICAL THERAPIST

## 2018-10-26 PROCEDURE — 97140 MANUAL THERAPY 1/> REGIONS: CPT | Performed by: PHYSICAL THERAPIST

## 2018-10-26 PROCEDURE — 97016 VASOPNEUMATIC DEVICE THERAPY: CPT | Performed by: PHYSICAL THERAPIST

## 2018-10-26 NOTE — FLOWSHEET NOTE
effort she put in to lift her leg off the prop. Feeling tight into ext today for this reason.      (-) giving way  (+) night pain/ stiffness; knee is warm; can't get comfortable  (-) popping/ 1x on L hip  (-)catching/locking  (+) swelling  (+) stiffness/ prolonged position  (+) stairs/   (NO) kneeling/squatting      OBJECTIVE:   10/22/18            ROM PROM AROM Overpressure Comment     L R L R L R     Flexion 143 133          ERMI   Extension +3 -1 +5 0                                                9/25/18  Strength L R Comment   Quad 4+ 3+ JOSH 0°   Hamstring 4+ 4-     Gastroc       Hip  flexion 4+ 4+     Hip abd 4 4-                            9/5/18  Special Test Results/Comment   Homans Neg   Temp Neg      8/23/18  Girth L R   Mid Patella 39.2 40.4   Suprapatellar 39.8 41.6   5cm above 43.7 44.7   15cm above          TEST INITIAL 8/23/18   GOAL   SINGLE LEG STANCE TIME L: 30\"+     R: 30\"+   >25 SECONDS         Reflexes/Sensation:               [x]Dermatomes/Myotomes intact               [x]Reflexes equal and normal bilaterally              []Other:     Joint mobility:                [x]Normal               []Hypo              []Hyper     Palpation: generalized TTP of entire knee 8/28/18     Functional Mobility/Transfers: Independent with floor to table transfers     Posture: good     Bandages/Dressings/Incisions: healing well; (-) redness     Gait:  Stiff-legged, slight flexed knee posture, antalgic, U/L crutch 9/17/18 normalized, no AD 9/25/18     RESTRICTIONS/PRECAUTIONS: none    Exercises/Interventions:   Exercise/Equipment Resistance/Repetitions Other comments   Stretching     Hamstring 5 x 30\" 3-positions; prop   Inclined Calf 5x30''    Hip Flexion     ITB 5 x 30\"    Groin     Quad 5 x 30\"         Bike 10' Res 4; Recumbent        SLR     Supine Abduction Adduction HEP   Prone    SLR+        Isometrics    Quad sets HEP        Patellar Glides     Medial    Superior    Inferior         ROM     Sheet Pulls Strength to good proximal hip strength and control in LE to allow for proper functional mobility as indicated by patients Functional Deficits. NOT MET  4. Patient will return to household functional activities without increased symptoms or restriction. NO TMET  5. Pt will be able to walk for 15 min without restriction in order to complete normal work duties. (patient specific functional goal)   NOT MET          Progression Towards Functional goals:  [x] Patient is progressing as expected towards functional goals listed. [] Progression is slowed due to complexities listed. [] Progression has been slowed due to co-morbidities. [] Plan just implemented, too soon to assess goals progression  [] Other:     ASSESSMENT: Pt's tolerance was reduced today compared to previous visits secondary to hamstring tenderness. Pt also began to have anterior knee pain inferior to patella. Good tolerance to IASTM with more striation in medial distal hamstrings. Balance exercises were held today secondary to increased symptoms. Will monitor closely from this point.      Treatment/Activity Tolerance:  [x] Patient tolerated treatment well [] Patient limited by fatique  [x] Patient limited by pain  [] Patient limited by other medical complications  [] Other:     Patient education:  8/23/18: full pre-op instructions  8/28/18: full post-op instructions  10/12/18: updated HEP - gym    Prognosis: [x] Good [] Fair  [] Poor    Patient Requires Follow-up: [x] Yes  [] No    PLAN: See felisha.   [x] Continue per plan of care [] Alter current plan (see comments)  [] Plan of care initiated [] Hold pending MD visit [] Discharge    Electronically signed by: Manuela Manzanares PT

## 2018-10-30 ENCOUNTER — HOSPITAL ENCOUNTER (OUTPATIENT)
Dept: PHYSICAL THERAPY | Age: 58
Setting detail: THERAPIES SERIES
Discharge: HOME | End: 2018-10-30
Payer: COMMERCIAL

## 2018-10-30 DIAGNOSIS — M17.11 PRIMARY LOCALIZED OSTEOARTHROSIS OF RIGHT LOWER LEG: Primary | ICD-10-CM

## 2018-10-30 PROCEDURE — 97530 THERAPEUTIC ACTIVITIES: CPT | Performed by: PHYSICAL THERAPIST

## 2018-10-30 PROCEDURE — 97110 THERAPEUTIC EXERCISES: CPT | Performed by: PHYSICAL THERAPIST

## 2018-10-30 RX ORDER — METHYLPREDNISOLONE 4 MG/1
TABLET ORAL
Qty: 1 KIT | Refills: 0 | Status: SHIPPED | OUTPATIENT
Start: 2018-10-30 | End: 2018-11-05

## 2018-10-30 NOTE — FLOWSHEET NOTE
giving way  (+) night pain/ stiffness; knee is warm; can't get comfortable  (-) popping/ 1x on L hip  (-)catching/locking  (+) swelling  (+) stiffness/ prolonged position  (+) stairs/   (NO) kneeling/squatting      OBJECTIVE:   10/22/18            ROM PROM AROM Overpressure Comment     L R L R L R     Flexion 143 133          ERMI   Extension +3 -1 +5 0                                                9/25/18  Strength L R Comment   Quad 4+ 3+ JOSH 0°   Hamstring 4+ 4-     Gastroc       Hip  flexion 4+ 4+     Hip abd 4 4-                            9/5/18  Special Test Results/Comment   Homans Neg   Temp Neg      8/23/18  Girth L R   Mid Patella 39.2 40.4   Suprapatellar 39.8 41.6   5cm above 43.7 44.7   15cm above          TEST INITIAL 8/23/18   GOAL   SINGLE LEG STANCE TIME L: 30\"+     R: 30\"+   >25 SECONDS         Reflexes/Sensation:               [x]Dermatomes/Myotomes intact               [x]Reflexes equal and normal bilaterally              []Other:     Joint mobility:                [x]Normal               []Hypo              []Hyper     Palpation: generalized TTP of entire knee 8/28/18     Functional Mobility/Transfers: Independent with floor to table transfers     Posture: good     Bandages/Dressings/Incisions: healing well; (-) redness     Gait:  Stiff-legged, slight flexed knee posture, antalgic, U/L crutch 9/17/18 normalized, no AD 9/25/18     RESTRICTIONS/PRECAUTIONS: none    Exercises/Interventions:   Exercise/Equipment Resistance/Repetitions Other comments   Stretching     Hamstring 5 x 30\" 3-positions; prop   Inclined Calf 5x30''    Hip Flexion     ITB 5 x 30\"    Groin     Quad 5 x 30\"         Bike 10' Res 2; Recumbent        SLR     Supine Abduction 3 x 10 3#   Adduction Prone 3#   SLR+        Isometrics    Quad sets HEP        Patellar Glides     Medial    Superior    Inferior         ROM     Sheet Pulls     Hang Weights Weight Shift     ERMI 5 x 30\"    Bike          Glutes     Bridges 3 x 10 [employer]

## 2018-11-01 ENCOUNTER — OFFICE VISIT (OUTPATIENT)
Dept: FAMILY MEDICINE CLINIC | Age: 58
End: 2018-11-01
Payer: COMMERCIAL

## 2018-11-01 VITALS
HEART RATE: 72 BPM | WEIGHT: 141.4 LBS | DIASTOLIC BLOOD PRESSURE: 78 MMHG | BODY MASS INDEX: 25.86 KG/M2 | SYSTOLIC BLOOD PRESSURE: 118 MMHG | TEMPERATURE: 97.9 F | RESPIRATION RATE: 16 BRPM

## 2018-11-01 DIAGNOSIS — E78.5 HYPERLIPIDEMIA, UNSPECIFIED HYPERLIPIDEMIA TYPE: Primary | ICD-10-CM

## 2018-11-01 DIAGNOSIS — M17.10 PATELLOFEMORAL ARTHRITIS: ICD-10-CM

## 2018-11-01 PROCEDURE — 99213 OFFICE O/P EST LOW 20 MIN: CPT | Performed by: FAMILY MEDICINE

## 2018-11-01 ASSESSMENT — ENCOUNTER SYMPTOMS
VOMITING: 0
NAUSEA: 0
DIARRHEA: 0
CHEST TIGHTNESS: 0
ANAL BLEEDING: 0
SHORTNESS OF BREATH: 0
ABDOMINAL PAIN: 0
BLOOD IN STOOL: 0
CONSTIPATION: 0

## 2018-11-01 NOTE — PROGRESS NOTES
[facility]  [facility]  Dept: [phone]  Dept Fax: [phone] is a 65 y. o.female    Pt presents for follow up of OA. Pt feeling ok since lastvisit- no new complaints, issues, or problems, except as noted below:     Pt had Right PKR 8/27/2018 with Dr. Jane Servin- doing well overall, except persistent swelling- started on oral Medrol Dose pack 3 days ago. No side effects. Pt frustrated that she is not able to return to work ye- to follow up with Dr. Gloria Ferrer in 3 weeks for re-evaluation. Patient Active Problem List   Diagnosis    Patellofemoral arthritis    Right knee pain    S/P right knee arthroscopy    Osteoarthritis, localized, knee       Current Outpatient Prescriptions   Medication Sig Dispense Refill    methylPREDNISolone (MEDROL, KATIE,) 4 MG tablet Take by mouth. 1 kit 0    gabapentin (NEURONTIN) 300 MG capsule 1 tablet at HS. 30 capsule 1    rosuvastatin (CRESTOR) 20 MG tablet Take 1 tablet by mouth daily 30 tablet 11    aspirin EC 81 MG EC tablet Take 1 tablet by mouth 2 times daily for 14 days (Patient taking differently: Take 81 mg by mouth daily ) 28 tablet 0    Multiple Vitamins-Minerals (CENTRUM ADULTS PO) Take by mouth daily        No current facility-administered medications for this visit. Review of Systems   Constitutional: Negative for chills, diaphoresis, fatigue, fever and unexpected weight change. Eyes: Negative for visual disturbance. Respiratory: Negative for chest tightness and shortness of breath. Cardiovascular: Negative for chest pain, palpitations and leg swelling. Gastrointestinal: Negative for abdominal pain, anal bleeding, blood in stool, constipation, diarrhea, nausea and vomiting. Genitourinary: Negative for dysuria and hematuria. Musculoskeletal: Negative for neck pain. Neurological: Negative for dizziness, light-headedness and headaches.          OBJECTIVE     /78 (Site: Left Upper Arm, Position: Sitting, Cuff Size: Medium Adult)   Pulse 72   Temp 97.9 °F (36.6 °C) (Oral)   Resp 16   Wt 141 lb 6.4 oz (64.1 kg)   LMP 11/19/2013   BMI 25.86 kg/m²     Wt Readings from Last 3 Encounters:   11/01/18 141 lb 6.4 oz (64.1 kg)   09/11/18 136 lb (61.7 kg)   08/27/18 138 lb (62.6 kg)     Body mass index is 25.86 kg/m². Physical Exam   Constitutional: She is oriented to person, place, and time. She appears well-developed and well-nourished. HENT:   Head: Normocephalic and atraumatic. Right Ear: External ear normal.   Left Ear: External ear normal.   Nose: Nose normal.   Mouth/Throat: Oropharynx is clear and moist.   Eyes: Pupils are equal, round, and reactive to light. Conjunctivae and EOM are normal.   Neck: Normal range of motion. Neck supple. Cardiovascular: Normal rate, regular rhythm, normal heart sounds and intact distal pulses. Pulmonary/Chest: Effort normal and breath sounds normal.   Abdominal: Soft. Bowel sounds are normal.   Musculoskeletal: Normal range of motion. Neurological: She is alert and oriented to person, place, and time. She has normal reflexes. Skin: Skin is warm and dry. Psychiatric: She has a normal mood and affect. Her behavior is normal. Judgment and thought content normal.   Nursing note and vitals reviewed.     Component      Latest Ref Rng & Units 8/23/2018   Sodium      136 - 145 mmol/L 139   Potassium      3.5 - 5.1 mmol/L 4.1   Chloride      99 - 110 mmol/L 99   CO2      21 - 32 mmol/L 29   Anion Gap      3 - 16 11   Glucose      70 - 99 mg/dL 96   BUN      7 - 20 mg/dL 11   Creatinine      0.6 - 1.1 mg/dL 0.5 (L)   GFR Non-      >60 >60   GFR       >60 >60   Calcium      8.3 - 10.6 mg/dL 10.1   WBC      4.0 - 11.0 K/uL 4.6   RBC      4.00 - 5.20 M/uL 4.10   Hemoglobin Quant      12.0 - 16.0 g/dL 13.0   Hematocrit      36.0 - 48.0 % 38.2   MCV      80.0 - 100.0 fL 93.3   MCH      26.0 - 34.0 pg 31.6

## 2018-11-01 NOTE — PATIENT INSTRUCTIONS
Encouraged annual FLU VACCINE after October 1st- pt declines. (updated 11/1/2018)   Encouraged TETANUS SHOT (TdaP/ ADACEL/ BOOSTRIX) per personal pharmacy if greater than 10 years since last shot . Sanjeev Tinajero Encouraged NEW SHINGLES SHOT [facility]) - check with insurance re coverage and location of administration. PAP/ PELVIC management per Dr. Valentina Jacobo- last appt 1/2018- to follow up annually  MAMMO due after 7/30/2019  COLONOSCOPY done 8/16/2013 per Dr. Darius Hickman- to do in 10 years. Encouraged FREE HEEL SCAN at time of next [facility]  Track down previous HIV test from [facility]  Check TC and D-LDL in next month  Check FLP and CMP after 5/21/2019  Continue current medicines  No refills needed today. Follow up in 6/2019.

## 2018-11-02 ENCOUNTER — HOSPITAL ENCOUNTER (OUTPATIENT)
Dept: PHYSICAL THERAPY | Age: 58
Setting detail: THERAPIES SERIES
Discharge: HOME | End: 2018-11-02
Payer: COMMERCIAL

## 2018-11-02 PROCEDURE — 97530 THERAPEUTIC ACTIVITIES: CPT | Performed by: PHYSICAL THERAPIST

## 2018-11-02 PROCEDURE — 97110 THERAPEUTIC EXERCISES: CPT | Performed by: PHYSICAL THERAPIST

## 2018-11-02 NOTE — FLOWSHEET NOTE
Deficits. NOT MET  4. Patient will return to household functional activities without increased symptoms or restriction. NO TMET  5. Pt will be able to walk for 15 min without restriction in order to complete normal work duties. (patient specific functional goal)   NOT MET          Progression Towards Functional goals:  [x] Patient is progressing as expected towards functional goals listed. [] Progression is slowed due to complexities listed. [] Progression has been slowed due to co-morbidities. [] Plan just implemented, too soon to assess goals progression  [] Other:     ASSESSMENT: Pt doing better today, seems to be getting good benefit from medication change. Program was still reduced today in an attempt to keep symptoms progressing. Pt demonstrates good understanding of HEP to be doing until next visit.       Treatment/Activity Tolerance:  [x] Patient tolerated treatment well [] Patient limited by fatique  [x] Patient limited by pain  [] Patient limited by other medical complications  [] Other:     Patient education:  8/23/18: full pre-op instructions  8/28/18: full post-op instructions  10/12/18: updated HEP - gym    Prognosis: [x] Good [] Fair  [] Poor    Patient Requires Follow-up: [x] Yes  [] No    PLAN: See eval.   [x] Continue per plan of care [] Alter current plan (see comments)  [] Plan of care initiated [] Hold pending MD visit [] Discharge    Electronically signed by: Ayaka Llanes PT

## 2018-11-05 ENCOUNTER — HOSPITAL ENCOUNTER (OUTPATIENT)
Dept: PHYSICAL THERAPY | Age: 58
Setting detail: THERAPIES SERIES
Discharge: HOME | End: 2018-11-05
Payer: COMMERCIAL

## 2018-11-05 PROCEDURE — 97530 THERAPEUTIC ACTIVITIES: CPT | Performed by: PHYSICAL THERAPIST

## 2018-11-05 PROCEDURE — 97016 VASOPNEUMATIC DEVICE THERAPY: CPT | Performed by: PHYSICAL THERAPIST

## 2018-11-05 PROCEDURE — 97110 THERAPEUTIC EXERCISES: CPT | Performed by: PHYSICAL THERAPIST

## 2018-11-05 NOTE — FLOWSHEET NOTE
and somewhat burning pain. Cannot get comfortable.      (-) giving way  (+) night pain/ stiffness; knee is warm; can't get comfortable  (-) popping/ 1x on L hip  (-)catching/locking  (+) swelling  (+) stiffness/ prolonged position  (+) stairs/   (NO) kneeling/squatting      OBJECTIVE:   10/22/18            ROM PROM AROM Overpressure Comment     L R L R L R     Flexion 143 133          ERMI   Extension +3 -1 +5 0                                                9/25/18  Strength L R Comment   Quad 4+ 3+ JOSH 0°   Hamstring 4+ 4-     Gastroc       Hip  flexion 4+ 4+     Hip abd 4 4-                            9/5/18  Special Test Results/Comment   Homans Neg   Temp Neg      8/23/18  Girth L R   Mid Patella 39.2 40.4   Suprapatellar 39.8 41.6   5cm above 43.7 44.7   15cm above          TEST INITIAL 8/23/18   GOAL   SINGLE LEG STANCE TIME L: 30\"+     R: 30\"+   >25 SECONDS         Reflexes/Sensation:               [x]Dermatomes/Myotomes intact               [x]Reflexes equal and normal bilaterally              []Other:     Joint mobility:                [x]Normal               []Hypo              []Hyper     Palpation: generalized TTP of entire knee 8/28/18     Functional Mobility/Transfers: Independent with floor to table transfers     Posture: good     Bandages/Dressings/Incisions: healing well; (-) redness     Gait:  Stiff-legged, slight flexed knee posture, antalgic, U/L crutch 9/17/18 normalized, no AD 9/25/18     RESTRICTIONS/PRECAUTIONS: none    Exercises/Interventions:   Exercise/Equipment Resistance/Repetitions Other comments   Stretching     Hamstring 5 x 30\" 3-positions; prop   Inclined Calf 5x30''    Hip Flexion     ITB 5 x 30\"    Groin     Quad 5 x 30\"         Bike 10' Res 2.5; Recumbent        SLR     Supine 3 x 10    Abduction 3 x 10 3#   Adduction Prone 3 x 10 3#   SLR+ Held d/t LBP       Isometrics    Quad sets HEP        Patellar Glides     Medial    Superior    Inferior         ROM     Sheet Pulls core, proximal hip and LE for self care, mobility, lifting, and ambulation/stair navigation      Manual Treatments:  PROM / STM / Oscillations-Mobs:  G-I, II, III, IV (PA's, Inf., Post.)  [x] (59561) Provided manual therapy to mobilize LE, proximal hip and/or LS spine soft tissue/joints for the purpose of modulating pain, promoting relaxation,  increasing ROM, reducing/eliminating soft tissue swelling/inflammation/restriction, improving soft tissue extensibility and allowing for proper ROM for normal function with self care, mobility, lifting and ambulation. Modalities IVaso 15'    Charges:  Timed Code Treatment Minutes: 48'   Total Treatment Minutes: 72'       [] EVAL (LOW) 455 1011 (typically 20 minutes face-to-face)  [] EVAL (MOD) 12824 (typically 30 minutes face-to-face)  [] EVAL (HIGH) 03061 (typically 45 minutes face-to-face)  [] RE-EVAL   [x] XD(53532) x  2   [] IONTO  [] NMR (22876) x      [x] VASO  [] Manual (49487) x       [] Other:  [x] TA x  1    [] Mech Traction (09755)  [] ES(attended) (61675)      [] ES (un) (55554):     GOALS:   Patient stated goal: Be able to walk long distances, carry loads and do stairs without pain.      Therapist goals for Patient:   Short Term Goals: To be achieved in: 2 weeks  1. Independent in HEP and progression per patient tolerance, in order to prevent re-injury. MET  2. Patient will have a decrease in pain to facilitate improvement in movement, function, and ADLs as indicated by Functional Deficits. MET     Long Term Goals: To be achieved in: 12 weeks  1. Disability index score of 30% or less for the LEFS to assist with reaching prior level of function. NOT MET   2. Patient will demonstrate increased AROM to 0-135 deg to allow for proper joint functioning as indicated by patients Functional Deficits. NOT MET   3.  Patient will demonstrate an increase in Strength to good proximal hip strength and control in LE to allow for proper functional mobility as indicated by patients Functional Deficits. NOT MET  4. Patient will return to household functional activities without increased symptoms or restriction. NO TMET  5. Pt will be able to walk for 15 min without restriction in order to complete normal work duties. (patient specific functional goal)   NOT MET          Progression Towards Functional goals:  [x] Patient is progressing as expected towards functional goals listed. [] Progression is slowed due to complexities listed. [] Progression has been slowed due to co-morbidities. [] Plan just implemented, too soon to assess goals progression  [] Other:     ASSESSMENT: Pt progressively better with knee symptoms however low back symptoms have worsened. Adjustments were made to improve tolerance. Continuing to educate pt to keep activity modified and to do modified HEP.      Treatment/Activity Tolerance:  [x] Patient tolerated treatment well [] Patient limited by fatique  [x] Patient limited by pain  [] Patient limited by other medical complications  [] Other:     Patient education:  8/23/18: full pre-op instructions  8/28/18: full post-op instructions  10/12/18: updated HEP - gym    Prognosis: [x] Good [] Fair  [] Poor    Patient Requires Follow-up: [x] Yes  [] No    PLAN: See eval.   [x] Continue per plan of care [] Alter current plan (see comments)  [] Plan of care initiated [] Hold pending MD visit [] Discharge    Electronically signed by: Lorena Hernandez PT

## 2018-11-09 ENCOUNTER — APPOINTMENT (OUTPATIENT)
Dept: PHYSICAL THERAPY | Age: 58
End: 2018-11-09
Payer: COMMERCIAL

## 2018-11-12 ENCOUNTER — HOSPITAL ENCOUNTER (OUTPATIENT)
Dept: PHYSICAL THERAPY | Age: 58
Setting detail: THERAPIES SERIES
Discharge: HOME | End: 2018-11-12
Payer: COMMERCIAL

## 2018-11-12 PROCEDURE — 97530 THERAPEUTIC ACTIVITIES: CPT | Performed by: PHYSICAL THERAPIST

## 2018-11-12 PROCEDURE — 97110 THERAPEUTIC EXERCISES: CPT | Performed by: PHYSICAL THERAPIST

## 2018-11-12 PROCEDURE — 97016 VASOPNEUMATIC DEVICE THERAPY: CPT | Performed by: PHYSICAL THERAPIST

## 2018-11-15 ENCOUNTER — APPOINTMENT (OUTPATIENT)
Dept: PHYSICAL THERAPY | Age: 58
End: 2018-11-15
Payer: COMMERCIAL

## 2018-11-15 ENCOUNTER — TELEPHONE (OUTPATIENT)
Dept: FAMILY MEDICINE CLINIC | Age: 58
End: 2018-11-15

## 2018-11-15 DIAGNOSIS — E78.00 ELEVATED CHOLESTEROL: Primary | ICD-10-CM

## 2018-11-15 DIAGNOSIS — Z79.899 MEDICATION MANAGEMENT: ICD-10-CM

## 2018-11-16 ENCOUNTER — HOSPITAL ENCOUNTER (OUTPATIENT)
Dept: PHYSICAL THERAPY | Age: 58
Setting detail: THERAPIES SERIES
Discharge: HOME | End: 2018-11-16
Payer: COMMERCIAL

## 2018-11-16 PROCEDURE — 97110 THERAPEUTIC EXERCISES: CPT | Performed by: PHYSICAL THERAPIST

## 2018-11-16 PROCEDURE — 97016 VASOPNEUMATIC DEVICE THERAPY: CPT | Performed by: PHYSICAL THERAPIST

## 2018-11-16 PROCEDURE — 97530 THERAPEUTIC ACTIVITIES: CPT | Performed by: PHYSICAL THERAPIST

## 2018-11-20 ENCOUNTER — APPOINTMENT (OUTPATIENT)
Dept: PHYSICAL THERAPY | Age: 58
End: 2018-11-20
Payer: COMMERCIAL

## 2018-11-20 ENCOUNTER — HOSPITAL ENCOUNTER (OUTPATIENT)
Dept: PHYSICAL THERAPY | Age: 58
Setting detail: THERAPIES SERIES
Discharge: HOME | End: 2018-11-20
Payer: COMMERCIAL

## 2018-11-20 PROCEDURE — 97016 VASOPNEUMATIC DEVICE THERAPY: CPT | Performed by: PHYSICAL THERAPIST

## 2018-11-20 PROCEDURE — 97530 THERAPEUTIC ACTIVITIES: CPT | Performed by: PHYSICAL THERAPIST

## 2018-11-20 PROCEDURE — 97110 THERAPEUTIC EXERCISES: CPT | Performed by: PHYSICAL THERAPIST

## 2018-11-20 NOTE — FLOWSHEET NOTE
giving way  (+) night pain/ stiffness; knee is warm; can't get comfortable  (-) popping/ 1x on L hip  (-)catching/locking  (+) swelling  (+) stiffness/ prolonged position  (+) stairs/   (NO) kneeling/squatting      OBJECTIVE:   10/22/18            ROM PROM AROM Overpressure Comment     L R L R L R     Flexion 143 133          ERMI   Extension +3 -1 +5 0                                                9/25/18  Strength L R Comment   Quad 4+ 3+ JOSH 0°   Hamstring 4+ 4-     Gastroc       Hip  flexion 4+ 4+     Hip abd 4 4-                            9/5/18  Special Test Results/Comment   Homans Neg   Temp Neg      8/23/18  Girth L R   Mid Patella 39.2 40.4   Suprapatellar 39.8 41.6   5cm above 43.7 44.7   15cm above          TEST INITIAL 8/23/18   GOAL   SINGLE LEG STANCE TIME L: 30\"+     R: 30\"+   >25 SECONDS         Reflexes/Sensation:               [x]Dermatomes/Myotomes intact               [x]Reflexes equal and normal bilaterally              []Other:     Joint mobility:                [x]Normal               []Hypo              []Hyper     Palpation: generalized TTP of entire knee 8/28/18     Functional Mobility/Transfers: Independent with floor to table transfers     Posture: good     Bandages/Dressings/Incisions: healing well; (-) redness     Gait:  Stiff-legged, slight flexed knee posture, antalgic, U/L crutch 9/17/18 normalized, no AD 9/25/18     RESTRICTIONS/PRECAUTIONS: none    Exercises/Interventions:   Exercise/Equipment Resistance/Repetitions Other comments   Stretching     Hamstring 5 x 30\" 3-positions; prop   Inclined Calf 5 x 30''    Hip Flexion     ITB     Groin     Quad          Bike 10' Res 3.5; Recumbent        SLR     Supine Abduction Adduction Prone SLR+ Held d/t LBP       Isometrics    Quad sets HEP        Patellar Glides     Medial    Superior    Inferior         ROM     Sheet Pulls     Hang Weights Weight Shift     ERMI 5 x 30\" Light    Bike          Glutes     Bridges 3 x 10 Gray   Supine clams 3 x 10 Gray   S/L clams 3 x 10 Red        CKC     Calf raises 3x10 20# Debi Countess sits 1 x fatigue WS to R   Step ups 1 leg stand Squatting 3 x 5 To chair height; mirror feedback   CC TKE Balance 3x LOS hard L9   Tandem stance 4 x 30\" Airex   Side stepping 3 laps Louana Scarlet TB   Monster walks 3 laps Gray TB        PRE     Extension Flexion  RANGE:        Quantum machines     Leg press  3 x 10 RANGE: 80-10, 85# DL   Leg extension Leg curl 3 x 10 RANGE: 0-90, 25# DL         Manual interventions     TM Removal of PO bandage            Therapeutic Exercise and NMR EXR  [x] (14994) Provided verbal/tactile cueing for activities related to strengthening, flexibility, endurance, ROM for improvements in LE, proximal hip, and core control with self care, mobility, lifting, ambulation. [x] (53599) Provided verbal/tactile cueing for activities related to improving balance, coordination, kinesthetic sense, posture, motor skill, proprioception  to assist with LE, proximal hip, and core control in self care, mobility, lifting, ambulation and eccentric single leg control.      NMR and Therapeutic Activities:    [x] (37662 or 96031) Provided verbal/tactile cueing for activities related to improving balance, coordination, kinesthetic sense, posture, motor skill, proprioception and motor activation to allow for proper function of core, proximal hip and LE with self care and ADLs  [] (11137) Gait Re-education- Provided training and instruction to the patient for proper LE, core and proximal hip recruitment and positioning and eccentric body weight control with ambulation re-education including up and down stairs     Home Exercise Program:    [x] (24989) Reviewed/Progressed HEP activities related to strengthening, flexibility, endurance, ROM of core, proximal hip and LE for functional self-care, mobility, lifting and ambulation/stair navigation   [x] (89082)Reviewed/Progressed HEP activities related to improving balance, coordination,

## 2018-11-26 ENCOUNTER — APPOINTMENT (OUTPATIENT)
Dept: PHYSICAL THERAPY | Age: 58
End: 2018-11-26
Payer: COMMERCIAL

## 2018-11-27 ENCOUNTER — OFFICE VISIT (OUTPATIENT)
Dept: ORTHOPEDIC SURGERY | Age: 58
End: 2018-11-27

## 2018-11-27 ENCOUNTER — HOSPITAL ENCOUNTER (OUTPATIENT)
Dept: PHYSICAL THERAPY | Age: 58
Setting detail: THERAPIES SERIES
Discharge: HOME | End: 2018-11-27
Payer: COMMERCIAL

## 2018-11-27 VITALS
WEIGHT: 141 LBS | HEART RATE: 73 BPM | HEIGHT: 62 IN | BODY MASS INDEX: 25.95 KG/M2 | SYSTOLIC BLOOD PRESSURE: 127 MMHG | DIASTOLIC BLOOD PRESSURE: 84 MMHG

## 2018-11-27 DIAGNOSIS — Z98.890 STATUS POST RIGHT KNEE SURGERY: Primary | ICD-10-CM

## 2018-11-27 PROCEDURE — 97110 THERAPEUTIC EXERCISES: CPT | Performed by: PHYSICAL THERAPIST

## 2018-11-27 PROCEDURE — 99024 POSTOP FOLLOW-UP VISIT: CPT | Performed by: ORTHOPAEDIC SURGERY

## 2018-11-27 PROCEDURE — G8978 MOBILITY CURRENT STATUS: HCPCS | Performed by: PHYSICAL THERAPIST

## 2018-11-27 PROCEDURE — 97750 PHYSICAL PERFORMANCE TEST: CPT | Performed by: PHYSICAL THERAPIST

## 2018-11-27 PROCEDURE — G8979 MOBILITY GOAL STATUS: HCPCS | Performed by: PHYSICAL THERAPIST

## 2018-11-27 PROCEDURE — 97530 THERAPEUTIC ACTIVITIES: CPT | Performed by: PHYSICAL THERAPIST

## 2018-11-27 NOTE — LETTER
November 27, 2018      [address], [address] [facility]        Re: Paulca Brielle      To Whom It May Concern:      Patient can return to work on 11/28/18 full duty no restrictions.         Board Certified Orthopaedic Surgeon  President and [address] [place] [address]

## 2018-11-27 NOTE — PLAN OF CARE
2018    Patient Name:  Abner Wilson    :  1960  MRN: [id number]  Restrictions/Precautions:    Medical/Treatment Diagnosis Information:  · Diagnosis: M17.11 (ICD-10-CM) - Primary osteoarthritis of right knee  · Treatment Diagnosis: Pt presents with s/s consistent with MD diagnosis. Currently has mildly decreased ROM, weakness of entire LE, altered gait and decreased functional mobility. · S/P R Medial PKA LUIS ALFREDO 18  · Meds: , Baby Aspirin BID; Tylenol vs Aleve PRN   Insurance/Certification information:  PT Insurance Information: PT BENEFITS 2018/ [employer]/ ACTIVE/ DED 6400 MET 2600.11/ OOP 12,800/ PAYS 100%/ 40 VPCY COMB/ O AUTH REQ/ REF# [id number]/ [id number] CB/   Physician Information:  Referring Practitioner: Noah Salguero  Plan of care signed (Y/N): Yes    Date of Patient follow up with Physician: 12 weeks PO  Patient seen in consultation with Dr. Mackenzie Butler who established the initial/subsequent treatment protocol. 18: reviewed surgery and shown pics, surgery went well, very pleased with fit  18: seen in MD office, x-rays taken   10-9-18: prescribed neurontin for night and also advised to take 1-2 tylenol in addition if needed, MD states she is still early from surgery and symptoms should improve over the next 6 weeks  10-30-18: sensitive knee, medrol dose pk, cut PT by 50% and monitor symptoms    G-Code (if applicable):      Date G-Code Applied: 18   PT G-Codes  Functional Assessment Tool Used: LEFS  Score: 31% deficit  Functional Limitation: Mobility: Walking and moving around  Mobility: Walking and Moving Around Current Status (): At least 20 percent but less than 40 percent impaired, limited or restricted  Mobility: Walking and Moving Around Goal Status ():  At least 20 percent but less than 40 percent impaired, limited or restricted    Progress Note: [x]  Yes  []  No  Next due by: Visit #36       Latex Allergy:  [x]NO      []YES  Preferred Language for

## 2018-11-30 ENCOUNTER — APPOINTMENT (OUTPATIENT)
Dept: PHYSICAL THERAPY | Age: 58
End: 2018-11-30
Payer: COMMERCIAL

## 2018-12-10 RX ORDER — ROSUVASTATIN CALCIUM 40 MG/1
40 TABLET, COATED ORAL DAILY
Qty: 30 TABLET | Refills: 1 | Status: SHIPPED | OUTPATIENT
Start: 2018-12-10 | End: 2019-02-01 | Stop reason: SDUPTHER

## 2018-12-20 ENCOUNTER — TELEPHONE (OUTPATIENT)
Dept: FAMILY MEDICINE CLINIC | Age: 58
End: 2018-12-20

## 2018-12-20 NOTE — TELEPHONE ENCOUNTER
Patient is calling in regarding her cholesterol medication that was recently increased. She said since then her vision has become blurry and is wanting to know if it is maybe a reaction from the medication? Please call her to advise.

## 2019-01-03 ENCOUNTER — HOSPITAL ENCOUNTER (OUTPATIENT)
Dept: PHYSICAL THERAPY | Age: 59
Setting detail: THERAPIES SERIES
End: 2019-01-03
Payer: COMMERCIAL

## 2019-01-11 ENCOUNTER — HOSPITAL ENCOUNTER (OUTPATIENT)
Dept: PHYSICAL THERAPY | Age: 59
Setting detail: THERAPIES SERIES
Discharge: HOME | End: 2019-01-11
Payer: COMMERCIAL

## 2019-01-11 PROCEDURE — 97750 PHYSICAL PERFORMANCE TEST: CPT | Performed by: PHYSICAL THERAPIST

## 2019-01-11 PROCEDURE — 97110 THERAPEUTIC EXERCISES: CPT | Performed by: PHYSICAL THERAPIST

## 2019-01-11 PROCEDURE — 97530 THERAPEUTIC ACTIVITIES: CPT | Performed by: PHYSICAL THERAPIST

## 2019-02-01 RX ORDER — ROSUVASTATIN CALCIUM 40 MG/1
40 TABLET, COATED ORAL DAILY
Qty: 30 TABLET | Refills: 0 | Status: SHIPPED | OUTPATIENT
Start: 2019-02-01 | End: 2019-02-06 | Stop reason: SDUPTHER

## 2019-02-04 LAB — LDL CHOLESTEROL DIRECT: 115 MG/DL

## 2019-02-06 ENCOUNTER — TELEPHONE (OUTPATIENT)
Dept: FAMILY MEDICINE CLINIC | Age: 59
End: 2019-02-06

## 2019-02-06 RX ORDER — ROSUVASTATIN CALCIUM 40 MG/1
40 TABLET, COATED ORAL DAILY
Qty: 90 TABLET | Refills: 3 | Status: SHIPPED | OUTPATIENT
Start: 2019-02-06 | End: 2020-02-12 | Stop reason: SDUPTHER

## 2019-02-26 ENCOUNTER — HOSPITAL ENCOUNTER (OUTPATIENT)
Dept: PHYSICAL THERAPY | Age: 59
Setting detail: THERAPIES SERIES
Discharge: HOME | End: 2019-02-26
Payer: COMMERCIAL

## 2019-02-26 ENCOUNTER — OFFICE VISIT (OUTPATIENT)
Dept: ORTHOPEDIC SURGERY | Age: 59
End: 2019-02-26
Payer: COMMERCIAL

## 2019-02-26 VITALS
HEIGHT: 62 IN | DIASTOLIC BLOOD PRESSURE: 79 MMHG | WEIGHT: 135 LBS | SYSTOLIC BLOOD PRESSURE: 133 MMHG | HEART RATE: 65 BPM | BODY MASS INDEX: 24.84 KG/M2

## 2019-02-26 DIAGNOSIS — M25.561 RIGHT KNEE PAIN, UNSPECIFIED CHRONICITY: ICD-10-CM

## 2019-02-26 DIAGNOSIS — Z98.890 STATUS POST RIGHT KNEE SURGERY: ICD-10-CM

## 2019-02-26 DIAGNOSIS — M17.10 OSTEOARTHRITIS, LOCALIZED, KNEE: Primary | ICD-10-CM

## 2019-02-26 PROCEDURE — 99213 OFFICE O/P EST LOW 20 MIN: CPT | Performed by: ORTHOPAEDIC SURGERY

## 2019-02-26 PROCEDURE — 97750 PHYSICAL PERFORMANCE TEST: CPT | Performed by: PHYSICAL THERAPIST

## 2019-02-26 PROCEDURE — G8979 MOBILITY GOAL STATUS: HCPCS | Performed by: PHYSICAL THERAPIST

## 2019-02-26 PROCEDURE — 97110 THERAPEUTIC EXERCISES: CPT | Performed by: PHYSICAL THERAPIST

## 2019-02-26 PROCEDURE — G8978 MOBILITY CURRENT STATUS: HCPCS | Performed by: PHYSICAL THERAPIST

## 2019-04-30 ENCOUNTER — APPOINTMENT (OUTPATIENT)
Dept: PHYSICAL THERAPY | Age: 59
End: 2019-04-30
Payer: COMMERCIAL

## 2019-05-07 ENCOUNTER — HOSPITAL ENCOUNTER (OUTPATIENT)
Dept: PHYSICAL THERAPY | Age: 59
Setting detail: THERAPIES SERIES
Discharge: HOME | End: 2019-05-07
Payer: COMMERCIAL

## 2019-05-07 PROCEDURE — 97750 PHYSICAL PERFORMANCE TEST: CPT | Performed by: PHYSICAL THERAPIST

## 2019-05-07 PROCEDURE — 97530 THERAPEUTIC ACTIVITIES: CPT | Performed by: PHYSICAL THERAPIST

## 2019-05-07 PROCEDURE — 97110 THERAPEUTIC EXERCISES: CPT | Performed by: PHYSICAL THERAPIST

## 2019-05-07 NOTE — FLOWSHEET NOTE
discomfort but has not been able to fully resume normal hamstring strengthening routine. Does continue to have stiffness with prolonged positioning, greater than 1 hour. Made changes with challenge of program with increased emphasis on increased weight/reps. Feels her strength has improved since last visit. (-) giving way  (-) night pain/ stiffness; reducing difficulty sleeping  (-) popping/ 1x on L hip  (-)catching/locking  (-) swelling - mild  (+) stiffness/ prolonged position (3hrs+)  (-) stairs/   (-) kneeling/squatting      OBJECTIVE:   2/26/19            ROM PROM AROM Overpressure Comment     L R L R L R     Flexion 143 138    132      ERMI   Extension +3 0 +5 0                                                2/26/19  Strength L R Comment   Quad   See biodex   Hamstring   See biodex   Gastroc       Hip  flexion 4+ 4+     Hip abd 4+ 4+                             2/26/19  Girth L R   Mid Patella 39.2 39.2   Suprapatellar 39.8 41.5   5cm above 43.7 43.3   15cm above          TEST INITIAL 8/23/18 11/27/18 2/26/19 5/7/19 GOAL   SINGLE LEG STANCE TIME L: 30\"+     R: 30\"+  L: 30\"+    R: 30\"+ L: 30\"+    R: 30\"+ L: 30\"+    R: 30\"+ >25 SECONDS     Score Sheet for Y Balance Test  5/7/19 Left Right Difference Comments   Anterior 58.3 cm 52 cm -6.5 cm    Posteromedial 100.5 cm 109 cm +8.5 cm    Posterolateral 96 cm 93 cm -3.0 cm      Knee Isometric Strength Testing Results Summary  On 5/7/19 the patient, Jeraldine Hodgkins, underwent an Isometric Strength Test to evaluate current status and progress of the strengthening phase of his/her current rehabilitation program.  He/She is currently being treated for PKA. Attached you will find a computer generated summary of the results which outlines the current status. To summarize these results you will find that Jeraldine Hodgkins underwent a test measuring the strength of the knee Quadriceps and Hamstrings muscle groups at the isometric angle 60 degrees.   Standard protocol of testing is to provide pre-test stretching and warm up of both extremities followed by instruction in the test procedure and \"practice\" repetitions prior to each actual test session. The uninjured extremity undergoes the test procedure first followed by the injured extremity. Test Results:    Bilateral Difference:  Quadricep  17% [x] Deficit   [] Surplus   Hamstring  9% [x] Deficit   [] Surplus     Normative Data:  Quadricep Normal: 50% Patient: 51%   Hamstring Normal: 30% Patient: 17%   Progress since 2/26/19  · Quads:74% stronger  · Hams:30% stronger     Reflexes/Sensation:               [x]Dermatomes/Myotomes intact               [x]Reflexes equal and normal bilaterally              []Other:     Joint mobility:                [x]Normal               []Hypo              []Hyper     Palpation: generalized TTP of entire knee 8/28/18 anterolateral joint line 11/27/18 minimal TTP 2/26/19     Functional Mobility/Transfers:  Mod Ind     Posture: good     Bandages/Dressings/Incisions: healing well; (-) redness     Gait:  Stiff-legged, slight flexed knee posture, antalgic, U/L crutch 9/17/18 normalized, no AD 9/25/18     RESTRICTIONS/PRECAUTIONS: none    Exercises/Interventions:   Exercise/Equipment Resistance/Repetitions Other comments   Stretching     Hamstring 5 x 30\" 3-positions; prop   Inclined Calf 5 x 30''    Hip Flexion     ITB     Groin     Quad 5 x 30\"         Bike 10' Res 3.5; Recumbent        SLR     Supine Abduction Adduction Prone SLR+ Held d/t LBP       Isometrics    Quad sets HEP        Patellar Glides     Medial    Superior    Inferior         ROM     Sheet Pulls     Hang Weights Weight Shift     ERMI Bike          Glutes/HS     Bridges Supine clams S/L clams SL DL Bridge + HS curl - ball      CKC     Calf raises Wall sits Step ups 1 leg stand Squatting CC TKE Balance Tandem stance Side stepping Monster walks      PRE     Extension Flexion  RANGE:        Quantum machines     Leg press  Leg extension Leg curl      Manual interventions     TM Removal of PO bandage            Therapeutic Exercise and NMR EXR  [x] (38858) Provided verbal/tactile cueing for activities related to strengthening, flexibility, endurance, ROM for improvements in LE, proximal hip, and core control with self care, mobility, lifting, ambulation. [x] (36969) Provided verbal/tactile cueing for activities related to improving balance, coordination, kinesthetic sense, posture, motor skill, proprioception  to assist with LE, proximal hip, and core control in self care, mobility, lifting, ambulation and eccentric single leg control.      NMR and Therapeutic Activities:    [x] (81117 or 48445) Provided verbal/tactile cueing for activities related to improving balance, coordination, kinesthetic sense, posture, motor skill, proprioception and motor activation to allow for proper function of core, proximal hip and LE with self care and ADLs  [] (88408) Gait Re-education- Provided training and instruction to the patient for proper LE, core and proximal hip recruitment and positioning and eccentric body weight control with ambulation re-education including up and down stairs     Home Exercise Program:    [x] (49932) Reviewed/Progressed HEP activities related to strengthening, flexibility, endurance, ROM of core, proximal hip and LE for functional self-care, mobility, lifting and ambulation/stair navigation   [x] (12466)Reviewed/Progressed HEP activities related to improving balance, coordination, kinesthetic sense, posture, motor skill, proprioception of core, proximal hip and LE for self care, mobility, lifting, and ambulation/stair navigation      Manual Treatments:  PROM / STM / Oscillations-Mobs:  G-I, II, III, IV (PA's, Inf., Post.)  [x] (40432) Provided manual therapy to mobilize LE, proximal hip and/or LS spine soft tissue/joints for the purpose of modulating pain, promoting relaxation,  increasing ROM, reducing/eliminating soft tissue swelling/inflammation/restriction, improving soft tissue extensibility and allowing for proper ROM for normal function with self care, mobility, lifting and ambulation. Modalities I  Pt declined    Charges:  Timed Code Treatment Minutes: 48'   Total Treatment Minutes: 50'   10:30 - 11:25    [] EVAL (LOW) 89426 (typically 20 minutes face-to-face)  [] EVAL (MOD) 38802 (typically 30 minutes face-to-face)  [] EVAL (HIGH) 91449 (typically 45 minutes face-to-face)  [] RE-EVAL   [x] HY(97863) x  1   [] IONTO  [] NMR (99049) x      [] VASO  [] Manual (04815) x       [x] Other: PPT x 1  [x] TA x  1    [] Mech Traction (01120)  [] ES(attended) (15327)      [] ES (un) (75612):     GOALS:   Patient stated goal: Be able to walk long distances, carry loads and do stairs without pain.      Therapist goals for Patient:   Short Term Goals: To be achieved in: 2 weeks  1. Independent in HEP and progression per patient tolerance, in order to prevent re-injury. MET  2. Patient will have a decrease in pain to facilitate improvement in movement, function, and ADLs as indicated by Functional Deficits. MET     Long Term Goals: To be achieved in: 12 weeks  1. Disability index score of 30% or less for the LEFS to assist with reaching prior level of function. MET - 14% deficit   2. Patient will demonstrate increased AROM to 0-135 deg to allow for proper joint functioning as indicated by patients Functional Deficits. MET   3. Patient will demonstrate an increase in Strength to good proximal hip strength and control in LE to allow for proper functional mobility as indicated by patients Functional Deficits. MET  4. Patient will return to household functional activities without increased symptoms or restriction. MET  5.  Pt will be able to walk for 15 min without restriction in order to complete normal work duties. (patient specific functional goal)   MET          Progression Towards Functional goals:  [x] Patient is progressing as expected towards functional goals listed. [] Progression is slowed due to complexities listed. [] Progression has been slowed due to co-morbidities. [] Plan just implemented, too soon to assess goals progression  [] Other:     ASSESSMENT: Pt had excellent improvement in strength since last strength test, improving by 30-74% in hamstrings and quads. She has reduced the side to side deficit and reached goal quad strength when compared to age/gender norms. Continues to have larger deficit in hamstring strength when compared to age/gender norms, however with issues in hamstring tendonitis causing discomfort and program needing to be modified, this deficit in understandable. Discussed program and continued need to participate in this program a few days per week, as well as reintroducing hamstring strengthening back into program to tolerance. Pt had good understanding. Will return at 12 mos PO for repeat strength/function testing to ensure appropriate progress is being made. Pt will contact PT sooner with questions or concerns. Treatment/Activity Tolerance:  [x] Patient tolerated treatment well [] Patient limited by fatique  [x] Patient limited by pain  [] Patient limited by other medical complications  [] Other:     Patient education:  8/23/18: full pre-op instructions  8/28/18: full post-op instructions  10/12/18: updated HEP - gym  11/27/18: biodex test review; HEP  1/11/19: biodex test review; HEP  5/7/19: biodex test review; HEP    Prognosis: [x] Good [] Fair  [] Poor    Patient Requires Follow-up: [x] Yes  [] No    PLAN: See eval. Strength/function test in 12 mos PO.    [x] Continue per plan of care [] Alter current plan (see comments)  [] Plan of care initiated [] Hold pending MD visit [] Discharge    Electronically signed by: Regla Pizarro PT

## 2019-05-28 LAB
CHOLESTEROL, TOTAL: 189 MG/DL
CHOLESTEROL/HDL RATIO: ABNORMAL
HDLC SERPL-MCNC: 73 MG/DL (ref 35–70)
LDL CHOLESTEROL CALCULATED: 108 MG/DL (ref 0–160)
TRIGL SERPL-MCNC: 41 MG/DL
VLDLC SERPL CALC-MCNC: ABNORMAL MG/DL

## 2019-06-03 ENCOUNTER — TELEPHONE (OUTPATIENT)
Dept: FAMILY MEDICINE CLINIC | Age: 59
End: 2019-06-03

## 2019-08-07 ENCOUNTER — HOSPITAL ENCOUNTER (OUTPATIENT)
Dept: WOMENS IMAGING | Age: 59
Discharge: HOME | End: 2019-08-07
Payer: COMMERCIAL

## 2019-08-07 DIAGNOSIS — Z12.31 VISIT FOR SCREENING MAMMOGRAM: ICD-10-CM

## 2019-08-07 PROCEDURE — 77063 BREAST TOMOSYNTHESIS BI: CPT

## 2019-08-27 ENCOUNTER — OFFICE VISIT (OUTPATIENT)
Dept: ORTHOPEDIC SURGERY | Age: 59
End: 2019-08-27
Payer: COMMERCIAL

## 2019-08-27 ENCOUNTER — HOSPITAL ENCOUNTER (OUTPATIENT)
Dept: PHYSICAL THERAPY | Age: 59
Setting detail: THERAPIES SERIES
Discharge: HOME | End: 2019-08-27
Payer: COMMERCIAL

## 2019-08-27 VITALS
SYSTOLIC BLOOD PRESSURE: 133 MMHG | HEIGHT: 62 IN | HEART RATE: 84 BPM | BODY MASS INDEX: 24.83 KG/M2 | WEIGHT: 134.92 LBS | DIASTOLIC BLOOD PRESSURE: 75 MMHG

## 2019-08-27 DIAGNOSIS — Z98.890 STATUS POST RIGHT KNEE SURGERY: Primary | ICD-10-CM

## 2019-08-27 PROCEDURE — 97750 PHYSICAL PERFORMANCE TEST: CPT | Performed by: PHYSICAL THERAPIST

## 2019-08-27 PROCEDURE — 99213 OFFICE O/P EST LOW 20 MIN: CPT | Performed by: ORTHOPAEDIC SURGERY

## 2019-08-27 PROCEDURE — 97530 THERAPEUTIC ACTIVITIES: CPT | Performed by: PHYSICAL THERAPIST

## 2019-08-27 PROCEDURE — 97110 THERAPEUTIC EXERCISES: CPT | Performed by: PHYSICAL THERAPIST

## 2019-08-27 ASSESSMENT — ENCOUNTER SYMPTOMS
RESPIRATORY NEGATIVE: 1
ALLERGIC/IMMUNOLOGIC NEGATIVE: 1
EYES NEGATIVE: 1
GASTROINTESTINAL NEGATIVE: 1
BACK PAIN: 1

## 2019-08-27 NOTE — PROGRESS NOTES
Known Problems Paternal Grandfather     Arthritis Neg Hx     Birth Defects Neg Hx     Diabetes Neg Hx     Early Death Neg Hx     Hearing Loss Neg Hx     High Blood Pressure Neg Hx     High Cholesterol Neg Hx     Kidney Disease Neg Hx     Learning Disabilities Neg Hx     Mental Illness Neg Hx     Mental Retardation Neg Hx     Miscarriages / Stillbirths Neg Hx     Substance Abuse Neg Hx     Vision Loss Neg Hx     Other Neg Hx        Social History     Socioeconomic History    Marital status:      Spouse name: None    Number of children: None    Years of education: None    Highest education level: None   Occupational History    None   Social Needs    Financial resource strain: None    Food insecurity:     Worry: None     Inability: None    Transportation needs:     Medical: None     Non-medical: None   Tobacco Use    Smoking status: Never Smoker    Smokeless tobacco: Never Used   Substance and Sexual Activity    Alcohol use:  Yes     Alcohol/week: 0.0 standard drinks     Comment: Social    Drug use: No    Sexual activity: Not Currently     Comment:    Lifestyle    Physical activity:     Days per week: None     Minutes per session: None    Stress: None   Relationships    Social connections:     Talks on phone: None     Gets together: None     Attends Restoration service: None     Active member of club or organization: None     Attends meetings of clubs or organizations: None     Relationship status: None    Intimate partner violence:     Fear of current or ex partner: None     Emotionally abused: None     Physically abused: None     Forced sexual activity: None   Other Topics Concern    None   Social History Narrative    ** Merged History Encounter **            Current Outpatient Medications   Medication Sig Dispense Refill    diclofenac sodium 1 % GEL Apply 4 g topically 4 times daily 4 Tube 1    rosuvastatin (CRESTOR) 40 MG tablet Take 1 tablet by mouth daily 90 tablet strength. Diagnoses and treatments were reviewed with the patient today. Patient education material was provided. Questions were entertained and answered to the patient's satisfaction. Follow up in: 1 year for reevaluation      ITan ATC am scribing for and in the presence of Dr. Koko Chavez. The physical examination was performed by Dr. Koko Chavez. All counseling during the appointment was performed between the patient and Dr. Koko Chavez. 08/27/19 5:05 PM   Tan Leo ATC    This dictation was performed with a verbal recognition program (DRAGON) and it was checked for errors. It is possible that there are still dictated errors within this office note. If so, please bring any errors to my attention for an addendum. All efforts were made to ensure that this office note is accurate. This dictation was performed with a verbal recognition program (DRAGON) and it was checked for errors. It is possible that there are still dictated errors within this office note. If so, please bring any errors to my attention for an addendum. All efforts were made to ensure that this office note is accurate. Supervising Physician Attestation:  I, Dr. Koko Chavez, personally performed the services described in this documentation as scribed above, and it is both accurate and complete and I agree with all pertinent clinical information. I personally interviewed the patient and performed a physical examination and medical decision making. I discussed the patient's condition and treatment options and have  reviewed and agree with the past medical, family and social history unless otherwise noted. All of the patient's questions were answered.       Board Certified Orthopaedic Surgeon  [facility] and [address] [place] [address] and [address] [employer]  Professor genna Guerrero, Department of Orthopedic

## 2019-08-27 NOTE — FLOWSHEET NOTE
followed by instruction in the test procedure and \"practice\" repetitions prior to each actual test session. The uninjured extremity undergoes the test procedure first followed by the injured extremity. Test Results:    Bilateral Difference:  Quadricep  26% [x] Deficit   [] Surplus   Hamstring  14% [] Deficit   [x] Surplus     Normative Data:  Quadricep Normal: 50% Patient: 41%   Hamstring Normal: 30% Patient: 25%   Progress since 5/7/19  · Quads:25% weaker  · Hams:11% stronger     Reflexes/Sensation:               [x]Dermatomes/Myotomes intact               [x]Reflexes equal and normal bilaterally              []Other:     Joint mobility:                [x]Normal               []Hypo              []Hyper     Palpation: generalized TTP of entire knee 8/28/18 anterolateral joint line 11/27/18 minimal TTP 2/26/19 no TTP 8/27/19     Functional Mobility/Transfers:  Mod Ind     Posture: good     Bandages/Dressings/Incisions: healing well; (-) redness     Gait:  Stiff-legged, slight flexed knee posture, antalgic, U/L crutch 9/17/18 normalized, no AD 9/25/18     RESTRICTIONS/PRECAUTIONS: none    Exercises/Interventions:   Exercise/Equipment Resistance/Repetitions Other comments   Stretching     Hamstring 5 x 30\" 3-positions; prop   Inclined Calf 5 x 30''    Hip Flexion     ITB     Groin     Quad 5 x 30\"         Bike 10' Res 3.5; Recumbent        SLR     Supine Abduction Adduction Prone SLR+ Held d/t LBP       Isometrics    Quad sets HEP        Patellar Glides     Medial    Superior    Inferior         ROM     Sheet Pulls     Hang Weights Weight Shift     ERMI Bike          Glutes/HS     Bridges Supine clams S/L clams SL DL Bridge + HS curl - ball      CKC     Calf raises Wall sits Step ups 1 leg stand Squatting CC TKE Balance Tandem stance Side stepping Monster walks      PRE     Extension Flexion  RANGE:        Quantum machines     Leg press  Leg extension Leg curl      Manual interventions     TM Removal of PO

## 2019-09-10 ENCOUNTER — HOSPITAL ENCOUNTER (OUTPATIENT)
Dept: PHYSICAL THERAPY | Age: 59
Setting detail: THERAPIES SERIES
Discharge: HOME | End: 2019-09-10
Payer: COMMERCIAL

## 2019-09-10 PROCEDURE — 97110 THERAPEUTIC EXERCISES: CPT | Performed by: PHYSICAL THERAPIST

## 2019-09-10 PROCEDURE — 97530 THERAPEUTIC ACTIVITIES: CPT | Performed by: PHYSICAL THERAPIST

## 2019-09-10 NOTE — FLOWSHEET NOTE
[facility]    Physical Therapy Daily Treatment Note  Date:  9/10/2019    Patient Name:  Lucila Mejía    :  1960  MRN: [id number]  Restrictions/Precautions:    Medical/Treatment Diagnosis Information:  · Diagnosis: M17.11 (ICD-10-CM) - Primary osteoarthritis of right knee  · Treatment Diagnosis: Pt presents with s/s consistent with MD diagnosis. Currently has mildly decreased ROM, weakness of entire LE, altered gait and decreased functional mobility. · S/P R Medial PKA LUIS ALFREDO 18  · Meds: BID; Tylenol vs Aleve PRN   Insurance/Certification information:  PT Insurance Information: PT BENEFITS 2018/ [employer]/ ACTIVE/ DED 6400 MET 2600.11/ OOP 12,800/ PAYS 100%/ 40 VPCY COMB/ O AUTH REQ/ REF# [id number]/ 18 CB/   Physician Information:  Referring Practitioner: Brown Goodrich  Plan of care signed (Y/N): Yes    Date of Patient follow up with Physician: 6 mos PO in office  Patient seen in consultation with Dr. Mychal Dyer who established the initial/subsequent treatment protocol. 18: reviewed surgery and shown pics, surgery went well, very pleased with fit  18: seen in MD office, x-rays taken   10-9-18: prescribed neurontin for night and also advised to take 1-2 tylenol in addition if needed, MD states she is still early from surgery and symptoms should improve over the next 6 weeks  10-30-18: sensitive knee, medrol dose pk, cut PT by 50% and monitor symptoms    G-Code (if applicable):      Date G-Code Applied: 19   LEFS: 9% deficit    Progress Note: [x]  Yes  []  No  Next due by: Visit #36       Latex Allergy:  [x]NO      []YES  Preferred Language for Healthcare:   [x]English       []other:    Visit # Insurance Allowable   5 ()  26 () TBD ()  40 VPCY comb ()     Pain level:  0-1/10     SUBJECTIVE:  Pt saw Dr. Mychal Dyer after last PT visit. He recommended doing blood flow restriction training.  Understands that she

## 2019-09-12 ENCOUNTER — APPOINTMENT (OUTPATIENT)
Dept: PHYSICAL THERAPY | Age: 59
End: 2019-09-12
Payer: COMMERCIAL

## 2019-09-17 ENCOUNTER — APPOINTMENT (OUTPATIENT)
Dept: PHYSICAL THERAPY | Age: 59
End: 2019-09-17
Payer: COMMERCIAL

## 2019-09-19 ENCOUNTER — APPOINTMENT (OUTPATIENT)
Dept: PHYSICAL THERAPY | Age: 59
End: 2019-09-19
Payer: COMMERCIAL

## 2020-02-12 RX ORDER — ROSUVASTATIN CALCIUM 40 MG/1
40 TABLET, COATED ORAL DAILY
Qty: 90 TABLET | Refills: 0 | Status: SHIPPED | OUTPATIENT
Start: 2020-02-12

## 2020-02-12 NOTE — TELEPHONE ENCOUNTER
Patient is calling in asking if Dr Audi Munguia can prescribe her about 2 more months worth of the crestor 40 mg once daily to a [facility] in [facility] where she now lives. She can't get into a new pcp until March. She said there is a [facility] on [employer] in [facility], phone # [phone] (unable to find in system). Please advise.

## 2020-05-13 RX ORDER — ROSUVASTATIN CALCIUM 40 MG/1
TABLET, COATED ORAL
Qty: 90 TABLET | Refills: 0 | OUTPATIENT
Start: 2020-05-13

## 2021-08-26 NOTE — ED TRIAGE NOTES
Pt ambulatory into esuc with c/o bilateral neck pain, headache and hoarse voice for the past five days. Pt states neck pain 4. Pt states she is constantly clearing her throat. No  Redness noted. Salomon and KAMLA on CHRISTELLE Aviles requesting Dr. Chey Gutierrez create addendum to note stating patient tests 4 times daily. Based on the BS that we have here in the office. Pt is not testing 4 times daily. Pt will need to test 4 times a day until her upcoming appt on 9/15/21 and bring in the proof on her meter as well as written down on paper so Dr. Chey Gutierrez can put this in his note at next visit and patient can be approved for CGM as requested.

## 2022-11-16 LAB
CHOLESTEROL, TOTAL: 189 MG/DL
CHOLESTEROL/HDL RATIO: 2.9
HDLC SERPL-MCNC: 66 MG/DL (ref 35–70)
LDL CHOLESTEROL CALCULATED: 111 MG/DL (ref 0–160)
NONHDLC SERPL-MCNC: 123 MG/DL
TRIGL SERPL-MCNC: 58 MG/DL
VLDLC SERPL CALC-MCNC: NORMAL MG/DL

## 2022-12-08 LAB — MAMMOGRAPHY, EXTERNAL: NORMAL

## 2023-05-06 SDOH — HEALTH STABILITY: PHYSICAL HEALTH: ON AVERAGE, HOW MANY MINUTES DO YOU ENGAGE IN EXERCISE AT THIS LEVEL?: 30 MIN

## 2023-05-06 SDOH — HEALTH STABILITY: PHYSICAL HEALTH: ON AVERAGE, HOW MANY DAYS PER WEEK DO YOU ENGAGE IN MODERATE TO STRENUOUS EXERCISE (LIKE A BRISK WALK)?: 5 DAYS

## 2023-05-09 ENCOUNTER — OFFICE VISIT (OUTPATIENT)
Dept: ORTHOPEDIC SURGERY | Age: 63
End: 2023-05-09

## 2023-05-09 DIAGNOSIS — Z98.890 S/P RIGHT KNEE ARTHROSCOPY: ICD-10-CM

## 2023-05-09 DIAGNOSIS — M25.561 RIGHT KNEE PAIN, UNSPECIFIED CHRONICITY: Primary | ICD-10-CM

## 2023-05-09 NOTE — PROGRESS NOTES
Date:  2023    Name:  Lynnette Pérez  Address:  [address] [place] [address]    :  1960      Age:   65 y.o. Chief Complaint    Knee Pain (Left knee OPNP)      History of Present Illness:  Lynnette Pérez is a 65 y.o. female who presents for left knee pain. Patient states she was walking around her bed at home and had a hyperextension moment of her left knee and the second 1 a few days later about 2 weeks prior to the presenting today. She endorsed some pain medially as well as anteriorly. She does have history of right knee medial unicompartmental knee arthroplasty done about 9 years ago, functioning well. She denies any mechanical symptoms. The patient denies any new injury. The patient denies any catching, giving way, joint locking, numbness, paresthesias, or weakness. Past Medical History:   Diagnosis Date    Arthritis     right knee ?     Hyperlipidemia     Meniscus tear     Right Knee        Past Surgical History:   Procedure Laterality Date    BREAST BIOPSY Left 2003    benign lumpectomy     SECTION  1987    KNEE SURGERY Right 2014    arthroscopy    KNEE SURGERY Right 2016     RIGHT KNEE ARTHROSCOPY MEDIAL MENISCUS EXCISION,PATELLA-    [facility] CPTR-ASST SURGICAL NAVIGATION IMAGE-LESS Right 2018    RIGHT KNEE MEDIAL ARTHROPLASTY (LUIS ALFREDO) performed by Karen Kendrick MD at [facility] 70  ~[id number] [facility]       Family History   Problem Relation Age of Onset    Heart Disease Mother     Stroke Father     Cancer Father         Prostate Cancer    Asthma Sister     Depression Brother     No Known Problems Sister     Lung Cancer Maternal Grandmother     No Known Problems Maternal Grandfather     No Known Problems Paternal Grandmother     No Known Problems Paternal Grandfather     Arthritis Neg Hx     Birth Defects Neg Hx     Diabetes Neg Hx     Early Death Neg Hx     Hearing Loss Neg Hx

## 2023-06-08 ENCOUNTER — HOSPITAL ENCOUNTER (OUTPATIENT)
Dept: PHYSICAL THERAPY | Age: 63
Setting detail: THERAPIES SERIES
Discharge: HOME | End: 2023-06-08
Payer: COMMERCIAL

## 2023-06-08 PROCEDURE — 97161 PT EVAL LOW COMPLEX 20 MIN: CPT | Performed by: PHYSICAL THERAPIST

## 2023-06-08 PROCEDURE — 97112 NEUROMUSCULAR REEDUCATION: CPT | Performed by: PHYSICAL THERAPIST

## 2023-06-08 PROCEDURE — 97110 THERAPEUTIC EXERCISES: CPT | Performed by: PHYSICAL THERAPIST

## 2023-06-08 NOTE — PLAN OF CARE
with her knees. Pt mentions no notable imaging mentioned by the doctor. Pt has more L>R pain and weakness in her knees. Pt has the occasional L sciatic pain. Relevant Medical History: OA, R Knee Medial Partial   Functional Disability Index: LEFS 29% Deficit     Pain Scale: 0-4/10  Easing factors: Rest and Rubbing along the patellar tendon  Provocative factors: Excessive walking and stairs     Type: []Constant   []Intermittent  [x]Radiating: some radiating L sciatic pain  [x]Localized: occasional pain along the patellar tendon []other:    Numbness/Tingling: N/A    Occupation/School:  (combination of walking in the morning and during her lunch break and sitting in the afternoon). Enjoys being active and wants to get back to [employer]. Living Status/Prior Level of Function: Independent with ADLs and IADLs, household activities, recreational activities unrestricted. OBJECTIVE:      Flexibility L R Comment   Hamstring WNL WNL    Gastroc WNL WNL    ITB WNL WNL    Quad WNL WNL            ROM PROM AROM Overpressure Comment    L R L R L R    Flexion WNL WNL WNL WNL      Extension WNL WNL WNL WNL                              Strength L R Comment   Quad 5/5 5/5    Hamstring 4-/5 4/5    Gastroc      Hip  flexion 4+/5 4+/5    Hip abd 4/5 4-/5    Glutes 4/5 4+/5              Special Test Results/Comment   Meniscal Click    Crepitus Mild Crepitus Bilateral Knee   Flexion Test Negative    Valgus Laxity    Varus Laxity    Lachmans    Drop Back    Homans          Joint mobility:    []Normal    []Hypo   [x]Hyper    Palpation: Pt had taught and minor pain in the medial and lateral hamstring tendons L>R. Functional Mobility/Transfers: WNL    Posture: Genu Recurvatum     Gait: No ADs. Pt is independent with gait. Pt demonstrates with decreased knee flexion during loading and flat foot phase of gait with excessive hyperextension during stance to push off phase. Dynamic varus thrust on L during stance phase.

## 2023-06-08 NOTE — FLOWSHEET NOTE
[] Progressing: [] Met: [] Not Met: [] Adjusted  3. Patient will demonstrate an increase in Strength to good proximal hip strength and control, within 5lb HHD in LE to allow for proper functional mobility as indicated by patients Functional Deficits. [] Progressing: [] Met: [] Not Met: [] Adjusted  4. Patient will return to hiking functional activities without increased symptoms or restriction. [] Progressing: [] Met: [] Not Met: [] Adjusted  5. Pt will demonstrate appropriate gait mechanics for 10 minutes of treadmill walking. (patient specific functional goal)    [] Progressing: [] Met: [] Not Met: [] Adjusted         Overall Progression Towards Functional goals/ Treatment Progress Update:  [] Patient is progressing as expected towards functional goals listed. [] Progression is slowed due to complexities/Impairments listed. [] Progression has been slowed due to co-morbidities. [x] Plan just implemented, too soon to assess goals progression <30days   [] Goals require adjustment due to lack of progress  [] Patient is not progressing as expected and requires additional follow up with physician  [] Other    Prognosis for POC: [x] Good [] Fair  [] Poor      Patient requires continued skilled intervention: [x] Yes  [] No    Treatment/Activity Tolerance:  [x] Patient able to complete treatment  [] Patient limited by fatigue  [] Patient limited by pain     [] Patient limited by other medical complications  [] Other:         PLAN: See eval  [] Continue per plan of care [] Alter current plan (see comments above)  [x] Plan of care initiated [] Hold pending MD visit [] Discharge      Electronically signed by:  Kostas Oakes PT, DPT    Note: If patient does not return for scheduled/ recommended follow up visits, this note will serve as a discharge from care along with most recent update on progress.

## 2023-06-29 ENCOUNTER — HOSPITAL ENCOUNTER (OUTPATIENT)
Dept: PHYSICAL THERAPY | Age: 63
Setting detail: THERAPIES SERIES
Discharge: HOME | End: 2023-06-29
Payer: COMMERCIAL

## 2023-06-29 PROCEDURE — 97112 NEUROMUSCULAR REEDUCATION: CPT | Performed by: PHYSICAL THERAPIST

## 2023-06-29 PROCEDURE — 97110 THERAPEUTIC EXERCISES: CPT | Performed by: PHYSICAL THERAPIST

## 2023-07-20 ENCOUNTER — HOSPITAL ENCOUNTER (OUTPATIENT)
Dept: PHYSICAL THERAPY | Age: 63
Setting detail: THERAPIES SERIES
Discharge: HOME | End: 2023-07-20
Payer: COMMERCIAL

## 2023-07-20 PROCEDURE — 97110 THERAPEUTIC EXERCISES: CPT | Performed by: PHYSICAL THERAPIST

## 2023-07-20 PROCEDURE — 97112 NEUROMUSCULAR REEDUCATION: CPT | Performed by: PHYSICAL THERAPIST

## 2023-07-20 NOTE — FLOWSHEET NOTE
[facility] and [address] [place] [address] [address],  [facility], [address] [place] [address]  [address] [facility], [address] [place] [address] Avenue  Phone: [phone]  Fax: [phone]      Physical Therapy Daily Treatment Note  Date:  2023    Patient Name:  Pete Weaver    :  1960  MRN: [id number]  Restrictions/Precautions:    Medical/Treatment Diagnosis Information:  Left knee pain [M25.562]  Treatment Diagnosis: Gait Abnormality R26.9, Weakness O40.5  Insurance/Certification information:  PT Insurance Information: MED [employer] [id number]  Physician Information:  Lindsey Vu MD    Has the plan of care been signed (Y/N):        []  Yes  [x]  No     Date of Patient follow up with Physician: SHELLEY      Is this a Progress Report:     []  Yes  [x]  No        If Yes:  Date Range for reporting period:  Beginning 2023  Ending    Progress report will be due (10 Rx or 30 days whichever is less): 900      Recertification will be due (POC Duration  / 90 days whichever is less): 8 weeks       PN NV  Visit # Insurance Allowable Auth Required   4 25 []  Yes [x]  No          OUTCOME MEASURE DATE SCORE   LEFS 2023 29% deficit            Latex Allergy:  [x]NO      []YES  Preferred Language for Healthcare:   [x]English       []other:    Pain level:  0-4/10     SUBJECTIVE:  Pt feels she's improving well. More stable without any instances of buckling or hyperextending while walking. Feeling stronger all around. Is just having some proximal shin pain with the SL reaches. Not having that symptoms with anything else. Occasionally gets dull symptoms down lateral lower leg when working out or walking long distances.      OBJECTIVE:        Flexibility L R Comment   Hamstring WNL WNL     Gastroc WNL WNL     ITB WNL WNL     Quad WNL WNL                            ROM PROM AROM Overpressure Comment     L R L R L R     Flexion WNL WNL WNL WNL         Extension WNL WNL WNL WNL

## 2023-08-10 ENCOUNTER — HOSPITAL ENCOUNTER (OUTPATIENT)
Dept: PHYSICAL THERAPY | Age: 63
Setting detail: THERAPIES SERIES
Discharge: HOME | End: 2023-08-10
Payer: COMMERCIAL

## 2023-08-10 PROCEDURE — 97110 THERAPEUTIC EXERCISES: CPT | Performed by: PHYSICAL THERAPIST

## 2023-08-10 PROCEDURE — 97112 NEUROMUSCULAR REEDUCATION: CPT | Performed by: PHYSICAL THERAPIST

## 2023-08-10 NOTE — DISCHARGE SUMMARY
[facility] and [address] [place] [address] [address],  [facility], [address]  [facility] [address]  Phone: [phone]  Fax: [phone]      Physical Therapy Daily Treatment Note/Discharge Summary  Date:  8/10/2023    Patient Name:  Koki Skaggs    :  1960  MRN: [id number]  Restrictions/Precautions:    Medical/Treatment Diagnosis Information:  Left knee pain [M25.562]  Treatment Diagnosis: Gait Abnormality R26.9, Weakness P15.8  Insurance/Certification information:  PT Insurance Information: MED [employer] [id number]  Physician Information:  Mai Ferrell MD    Has the plan of care been signed (Y/N):        [x]  Yes  []  No     Date of Patient follow up with Physician: PRN      Is this a Progress Report:     []  Yes  [x]  No        If Yes:  Date Range for reporting period:  Beginning 2023  Endin23    Progress report will be due (10 Rx or 30 days whichever is less): n/a      Recertification will be due (POC Duration  / 90 days whichever is less): n/a        Visit # Insurance Allowable Auth Required   5 25 []  Yes [x]  No          OUTCOME MEASURE DATE SCORE   LEFS 2023 30% deficit   LEFS 23 15% deficit       Latex Allergy:  [x]NO      []YES  Preferred Language for Healthcare:   [x]English       []other:    Pain level:  0/10     SUBJECTIVE:  Pt very happy with her current status. Feeling much more confident and can tell she's walking more normal. HEP remains challenging but is feeling easier. Has stayed consistent with HEP to date. Feels comfortable continuing program independently at this time.      OBJECTIVE:        Flexibility L R Comment   Hamstring WNL WNL     Gastroc WNL WNL     ITB WNL WNL     Quad WNL WNL                            ROM PROM AROM Overpressure Comment     L R L R L R     Flexion WNL WNL WNL WNL         Extension WNL WNL WNL WNL                                                   Strength

## 2023-12-13 ENCOUNTER — COMMUNITY OUTREACH (OUTPATIENT)
Dept: FAMILY MEDICINE CLINIC | Age: 63
End: 2023-12-13

## (undated) DEVICE — WAX SURG 2.5GM HEMSTAT BNE BEESWAX PARAFFIN ISO PALMITATE

## (undated) DEVICE — PIN BNE FIX TEMP L140MM DIA4MM MAKO

## (undated) DEVICE — TUBING IRRIG HI FLO RIO SYS ANSPACH EMAX 2

## (undated) DEVICE — SUTURE ETHBND EXCEL SZ 0 L18IN NONABSORBABLE GRN L36MM CT-1 CX21D

## (undated) DEVICE — SURE SET-DOUBLE BASIN-LF: Brand: MEDLINE INDUSTRIES, INC.

## (undated) DEVICE — DUAL CUT SAGITTAL BLADE

## (undated) DEVICE — TURNOVER KIT RM INF CTRL TECH

## (undated) DEVICE — BLADE ES L2.75IN ELASTOMERIC COAT DURABLE BEND UPTO 90DEG

## (undated) DEVICE — PACK,BASIC: Brand: MEDLINE

## (undated) DEVICE — KIT TRK KNEE PROC VIZADISC

## (undated) DEVICE — SOLUTION IV 1000ML 0.9% SOD CHL

## (undated) DEVICE — PIN BNE FIX TEMP L80MM DIA4MM MAKO

## (undated) DEVICE — GLOVE SURG SZ 85 L12IN FNGR THK75MIL WHT LTX POLYMER BEAD

## (undated) DEVICE — GOWN,SIRUS,POLYRNF,BRTHSLV,XLN/XXL,18/CS: Brand: MEDLINE

## (undated) DEVICE — 2108 SERIES SAGITTAL BLADE (19.5 X 1.27 X 90.0MM)

## (undated) DEVICE — INTENDED FOR TISSUE SEPARATION, AND OTHER PROCEDURES THAT REQUIRE A SHARP SURGICAL BLADE TO PUNCTURE OR CUT.: Brand: BARD-PARKER ® CARBON RIB-BACK BLADES

## (undated) DEVICE — CHLORAPREP 26ML ORANGE

## (undated) DEVICE — COVER LT HNDL BLU PLAS

## (undated) DEVICE — BUR SURG DIA6MM BALL CUT FLUT ANSPACH

## (undated) DEVICE — YANKAUER SUCTION INSTRUMENT WITHOUT CONTROL VENT, OPEN TIP, CLEAR: Brand: YANKAUER

## (undated) DEVICE — Z INACTIVE NO SUPPLIER SOLUTIONIRRIG 3000ML 0.9% SOD CHL FLX CONT [79720808] [HOSPIRA WORLDWIDE INC]

## (undated) DEVICE — HANDPIECE SUCTION TUBING INTERPULSE 10FT

## (undated) DEVICE — DECANTER BAG 9": Brand: MEDLINE INDUSTRIES, INC.

## (undated) DEVICE — SUTURE VCRL UD BR CT 3-0 18IN CT1 J838D

## (undated) DEVICE — PACK PROCEDURE SURG TOTAL KNEE

## (undated) DEVICE — Z DISCONTINUED PER MEDLINE USE 2741943 DRESSING AQUACEL 10 IN ALG W9XL25CM SIL CVR WTRPRF VIR BACT BARR ANTIMIC

## (undated) DEVICE — STRIP,CLOSURE,WOUND,MEDI-STRIP,1/2X4: Brand: MEDLINE

## (undated) DEVICE — SYRINGE IRRIG 60ML SFT PLIABLE BLB EZ TO GRP 1 HND USE W/

## (undated) DEVICE — COAXIAL HIGH FLOW TIP WITH SOFT SHIELD

## (undated) DEVICE — KIT INT FIX FEM TIB CKPT MAKOPLASTY

## (undated) DEVICE — SUTURE MCRYL SZ 4-0 L27IN ABSRB UD L19MM PS-2 1/2 CIR PRIM Y426H

## (undated) DEVICE — GOWN,SIRUS,POLYRNF,SETINSLV,L,20/CS: Brand: MEDLINE

## (undated) DEVICE — 3 BONE CEMENT MIXER: Brand: MIXEVAC

## (undated) DEVICE — CLIP SURG IRRIG SYS RIO

## (undated) DEVICE — 60 ML SYRINGE,CATHETER TIP: Brand: MONOJECT

## (undated) DEVICE — SST TWIST DRILL, STANDARD, 1.6MM DIA. X 127MM: Brand: MICROAIRE®

## (undated) DEVICE — GARMENT,MEDLINE,DVT,INT,CALF,MED, GEN2: Brand: MEDLINE

## (undated) DEVICE — PENCIL SMK EVAC ALL IN 1 DSGN ENH VISIBILITY IMPROVED AIR

## (undated) DEVICE — PAD,ABDOMINAL,5"X9",ST,LF,25/BX: Brand: MEDLINE INDUSTRIES, INC.

## (undated) DEVICE — GLOVE SURG SZ 85 L12IN FNGR THK87MIL DK GRN LTX FREE ISOLEX

## (undated) DEVICE — GOWN,SIRUS,POLYRNF,SETINSLV,XL,20/CS: Brand: MEDLINE

## (undated) DEVICE — SUTURE VCRL SZ 2-0 L18IN ABSRB UD CT-1 L36MM 1/2 CIR J839D

## (undated) DEVICE — GLOVE SURG SZ 9 L12IN FNGR THK87MIL DK GRN LTX POLYMER W

## (undated) DEVICE — ELECTRODE PT RET AD L9FT HI MOIST COND ADH HYDRGEL CORDED